# Patient Record
Sex: FEMALE | Employment: PART TIME | URBAN - METROPOLITAN AREA
[De-identification: names, ages, dates, MRNs, and addresses within clinical notes are randomized per-mention and may not be internally consistent; named-entity substitution may affect disease eponyms.]

---

## 2017-02-13 ENCOUNTER — TRANSFERRED RECORDS (OUTPATIENT)
Dept: HEALTH INFORMATION MANAGEMENT | Facility: CLINIC | Age: 37
End: 2017-02-13

## 2017-02-14 LAB
RUBELLA ANTIBODY IGG QUANTITATIVE: NORMAL IU/ML
TSH SERPL-ACNC: 1.88 MCU/ML

## 2017-02-18 LAB
ABO + RH BLD: NORMAL
ABO + RH BLD: NORMAL
BLD GP AB SCN SERPL QL: NEGATIVE
HBV SURFACE AG SERPL QL IA: NEGATIVE
HIV 1+2 AB+HIV1 P24 AG SERPL QL IA: NEGATIVE

## 2017-09-15 ENCOUNTER — TRANSFERRED RECORDS (OUTPATIENT)
Dept: HEALTH INFORMATION MANAGEMENT | Facility: CLINIC | Age: 37
End: 2017-09-15

## 2017-12-19 DIAGNOSIS — O36.80X0 PREGNANCY WITH INCONCLUSIVE FETAL VIABILITY: Primary | ICD-10-CM

## 2017-12-20 ENCOUNTER — TRANSFERRED RECORDS (OUTPATIENT)
Dept: HEALTH INFORMATION MANAGEMENT | Facility: CLINIC | Age: 37
End: 2017-12-20

## 2017-12-20 ENCOUNTER — RADIANT APPOINTMENT (OUTPATIENT)
Dept: ULTRASOUND IMAGING | Facility: CLINIC | Age: 37
End: 2017-12-20
Payer: COMMERCIAL

## 2017-12-20 ENCOUNTER — PRENATAL OFFICE VISIT (OUTPATIENT)
Dept: OBGYN | Facility: CLINIC | Age: 37
End: 2017-12-20
Payer: COMMERCIAL

## 2017-12-20 VITALS
HEART RATE: 64 BPM | BODY MASS INDEX: 19.76 KG/M2 | WEIGHT: 130.4 LBS | HEIGHT: 68 IN | DIASTOLIC BLOOD PRESSURE: 60 MMHG | SYSTOLIC BLOOD PRESSURE: 102 MMHG

## 2017-12-20 DIAGNOSIS — O09.511 SUPERVISION OF HIGH RISK ELDERLY PRIMIGRAVIDA IN FIRST TRIMESTER: Primary | ICD-10-CM

## 2017-12-20 DIAGNOSIS — O36.80X0 PREGNANCY WITH INCONCLUSIVE FETAL VIABILITY: ICD-10-CM

## 2017-12-20 LAB
ALBUMIN UR-MCNC: NEGATIVE MG/DL
APPEARANCE UR: CLEAR
BACTERIA #/AREA URNS HPF: ABNORMAL /HPF
BASOPHILS # BLD AUTO: 0 10E9/L (ref 0–0.2)
BASOPHILS NFR BLD AUTO: 0.2 %
BILIRUB UR QL STRIP: NEGATIVE
CAOX CRY #/AREA URNS HPF: ABNORMAL /HPF
COLOR UR AUTO: YELLOW
DIFFERENTIAL METHOD BLD: ABNORMAL
EOSINOPHIL # BLD AUTO: 0.1 10E9/L (ref 0–0.7)
EOSINOPHIL NFR BLD AUTO: 0.9 %
ERYTHROCYTE [DISTWIDTH] IN BLOOD BY AUTOMATED COUNT: 12.8 % (ref 10–15)
GLUCOSE UR STRIP-MCNC: NEGATIVE MG/DL
HCT VFR BLD AUTO: 37.6 % (ref 35–47)
HGB BLD-MCNC: 12.7 G/DL (ref 11.7–15.7)
HGB UR QL STRIP: NEGATIVE
KETONES UR STRIP-MCNC: NEGATIVE MG/DL
LEUKOCYTE ESTERASE UR QL STRIP: NEGATIVE
LYMPHOCYTES # BLD AUTO: 1.5 10E9/L (ref 0.8–5.3)
LYMPHOCYTES NFR BLD AUTO: 13.2 %
MCH RBC QN AUTO: 30 PG (ref 26.5–33)
MCHC RBC AUTO-ENTMCNC: 33.8 G/DL (ref 31.5–36.5)
MCV RBC AUTO: 89 FL (ref 78–100)
MONOCYTES # BLD AUTO: 0.7 10E9/L (ref 0–1.3)
MONOCYTES NFR BLD AUTO: 6.1 %
NEUTROPHILS # BLD AUTO: 9.3 10E9/L (ref 1.6–8.3)
NEUTROPHILS NFR BLD AUTO: 79.6 %
NITRATE UR QL: NEGATIVE
NON-SQ EPI CELLS #/AREA URNS LPF: ABNORMAL /LPF
PH UR STRIP: 6.5 PH (ref 5–7)
PLATELET # BLD AUTO: 240 10E9/L (ref 150–450)
RBC # BLD AUTO: 4.23 10E12/L (ref 3.8–5.2)
RBC #/AREA URNS AUTO: ABNORMAL /HPF
SOURCE: ABNORMAL
SP GR UR STRIP: 1.02 (ref 1–1.03)
UROBILINOGEN UR STRIP-ACNC: 0.2 EU/DL (ref 0.2–1)
WBC # BLD AUTO: 11.6 10E9/L (ref 4–11)
WBC #/AREA URNS AUTO: ABNORMAL /HPF

## 2017-12-20 PROCEDURE — 86780 TREPONEMA PALLIDUM: CPT | Performed by: NURSE PRACTITIONER

## 2017-12-20 PROCEDURE — 36415 COLL VENOUS BLD VENIPUNCTURE: CPT | Performed by: NURSE PRACTITIONER

## 2017-12-20 PROCEDURE — 85025 COMPLETE CBC W/AUTO DIFF WBC: CPT | Performed by: NURSE PRACTITIONER

## 2017-12-20 PROCEDURE — 81001 URINALYSIS AUTO W/SCOPE: CPT | Performed by: NURSE PRACTITIONER

## 2017-12-20 PROCEDURE — 99000 SPECIMEN HANDLING OFFICE-LAB: CPT | Performed by: NURSE PRACTITIONER

## 2017-12-20 PROCEDURE — 76817 TRANSVAGINAL US OBSTETRIC: CPT | Performed by: OBSTETRICS & GYNECOLOGY

## 2017-12-20 PROCEDURE — 40000791 ZZHCL STATISTIC VERIFI PRENATAL TRISOMY 21,18,13: Mod: 90 | Performed by: NURSE PRACTITIONER

## 2017-12-20 PROCEDURE — 87086 URINE CULTURE/COLONY COUNT: CPT | Performed by: NURSE PRACTITIONER

## 2017-12-20 PROCEDURE — 99207 ZZC FIRST OB VISIT: CPT | Performed by: NURSE PRACTITIONER

## 2017-12-20 RX ORDER — PROGESTERONE 50 MG/ML
INJECTION, SOLUTION INTRAMUSCULAR DAILY
COMMUNITY
End: 2018-01-09

## 2017-12-20 RX ORDER — PRENATAL VIT/IRON FUM/FOLIC AC 27MG-0.8MG
1 TABLET ORAL DAILY
COMMUNITY

## 2017-12-20 ASSESSMENT — ANXIETY QUESTIONNAIRES
1. FEELING NERVOUS, ANXIOUS, OR ON EDGE: NOT AT ALL
GAD7 TOTAL SCORE: 0
3. WORRYING TOO MUCH ABOUT DIFFERENT THINGS: NOT AT ALL
7. FEELING AFRAID AS IF SOMETHING AWFUL MIGHT HAPPEN: NOT AT ALL
5. BEING SO RESTLESS THAT IT IS HARD TO SIT STILL: NOT AT ALL
6. BECOMING EASILY ANNOYED OR IRRITABLE: NOT AT ALL
2. NOT BEING ABLE TO STOP OR CONTROL WORRYING: NOT AT ALL

## 2017-12-20 ASSESSMENT — PATIENT HEALTH QUESTIONNAIRE - PHQ9
SUM OF ALL RESPONSES TO PHQ QUESTIONS 1-9: 0
5. POOR APPETITE OR OVEREATING: NOT AT ALL

## 2017-12-20 NOTE — MR AVS SNAPSHOT
"              After Visit Summary   12/20/2017    Waqar Hogue    MRN: [id number]           Patient Information     Date Of Birth          1980        Visit Information        Provider Department      12/20/2017 1:30 PM Hermelinda Mckeon APRN CNP; WE TRIAGE [facility]        Today's Diagnoses     Supervision of high risk elderly primigravida in first trimester    -  1       Follow-ups after your visit        Your next 10 appointments already scheduled     Jan 09, 2018  3:40 PM CST   ESTABLISHED PRENATAL with Danielle Giang MD   [facility] ([facility])    [address] [id number] [phone]              Who to contact     If you have questions or need follow up information about today's clinic visit or your schedule please contact [facility] directly at [phone].  Normal or non-critical lab and imaging results will be communicated to you by MyChart, letter or phone within 4 business days after the clinic has received the results. If you do not hear from us within 7 days, please contact the clinic through C[employer] or phone. If you have a critical or abnormal lab result, we will notify you by phone as soon as possible.  Submit refill requests through [employer] or call your pharmacy and they will forward the refill request to us. Please allow 3 business days for your refill to be completed.          Additional Information About Your Visit        MyChart Information     [employer] lets you send messages to your doctor, view your test results, renew your prescriptions, schedule appointments and more. To sign up, go to [email or web address] . Click on \"Log in\" on the left side of the screen, which will take you to the Welcome page. Then click on \"Sign up Now\" on the right side of the page.     You will be asked to enter the access code listed below, as well as some personal " "information. Please follow the directions to create your username and password.     Your access code is: [email or web address]  Expires: 3/20/2018  3:42 PM     Your access code will  in 90 days. If you need help or a new code, please call your [place] clinic or [phone].        Care EveryWhere ID     This is your Care EveryWhere ID. This could be used by other organizations to access your [facility] medical records  [id number]        Your Vitals Were     Pulse Height BMI (Body Mass Index)             64 5' 8\" (1.727 m) 19.83 kg/m2          Blood Pressure from Last 3 Encounters:   17 102/60    Weight from Last 3 Encounters:   17 130 lb 6.4 oz (59.1 kg)              We Performed the Following     Anti Treponema     CBC with platelets differential     Non Invasive Prenatal Test Cell Free DNA     UA with Microscopic     Urine Culture Aerobic Bacterial        Primary Care Provider    None Specified       No primary provider on file.        Equal Access to Services     LUIS ANTONIO BARON : Hadii conchita Li, waalexda sushant, qadonata kaalmada padma, dalton moran . So [facility] [phone].    ATENCIÓN: Si habla español, tiene a contreras disposición servicios gratuitos de asistencia lingüística. Llame al [phone].    We comply with applicable federal civil rights laws and [place] laws. We do not discriminate on the basis of race, color, national origin, age, disability, sex, sexual orientation, or gender identity.            Thank you!     Thank you for choosing [facility] [place]  for your care. Our goal is always to provide you with excellent care. Hearing back from our patients is one way we can continue to improve our services. Please take a few minutes to complete the written survey that you may receive in the mail after your visit with us. Thank you!             Your Updated Medication List - Protect others around you: Learn how to safely use, store and throw " away your medicines at www.disposemymeds.org.          This list is accurate as of: 12/20/17  3:42 PM.  Always use your most recent med list.                   Brand Name Dispense Instructions for use Diagnosis    ESTRACE PO           FOLIC ACID PO      Take 1 mg by mouth daily        LOVENOX SC           prenatal multivitamin plus iron 27-0.8 MG Tabs per tablet      Take 1 tablet by mouth daily        progesterone (in sesame oil) 50 MG/ML injection      Inject into the muscle daily

## 2017-12-20 NOTE — PROGRESS NOTES
This is a 37 year old female patient,   who presents for her first obstetrical visit.    No LMP recorded. Patient is pregnant..  This gives her an EDC of 2018 .  She is 9w6d weeks.  Her cycles are regular.  Her last menstrual period was normal.  She has had an ultrasound on 2017 which showed 10w0d. .  Since her LMP, she has experienced  fatigue and breast tenderness, palptations, acne, anxiety unable to sleep).  She denies emesis, abdominal pain, headache, loss of appetite, vaginal discharge, dysuria, pelvic pain, urinary urgency, lightheadedness, urinary frequency, vaginal bleeding, hemorrhoids and constipation.        Past History:  Her past medical history History reviewed. No pertinent past medical history..      She has a history of  2 miscarriages    Since her last LMP she denies use of alcohol, tobacco and street drugs.    HISTORY:  Obstetric History       T0      L0     SAB2   TAB0   Ectopic0   Multiple0   Live Births0       # Outcome Date GA Lbr Dharmesh/2nd Weight Sex Delivery Anes PTL Lv   3 Current            2 2017           1 2007                History reviewed. No pertinent past medical history.  Past Surgical History:   Procedure Laterality Date     GYN SURGERY  2017    D&C for miscarriage     Family History   Problem Relation Age of Onset     Breast Cancer Mother      Social History     Social History     Marital status:      Spouse name: N/A     Number of children: N/A     Years of education: N/A     Social History Main Topics     Smoking status: Never Smoker     Smokeless tobacco: Never Used     Alcohol use No      Comment: not since pg     Drug use: No     Sexual activity: Yes     Partners: Male     Other Topics Concern     None     Social History Narrative     None     Current Outpatient Prescriptions   Medication Sig     Enoxaparin Sodium (LOVENOX SC)      progesterone, in sesame oil, 50 MG/ML injection Inject into the muscle daily      "Prenatal Vit-Fe Fumarate-FA (PRENATAL MULTIVITAMIN PLUS IRON) 27-0.8 MG TABS per tablet Take 1 tablet by mouth daily     Estradiol (ESTRACE PO)      FOLIC ACID PO Take 1 mg by mouth daily     No current facility-administered medications for this visit.      No Known Allergies    Past medical, surgical, social and family history were reviewed and updated in EPIC.    ROS:   12 point review of systems negative other than symptoms noted below.  Constitutional: Fatigue  Cardiovascular: Palpitations  Skin: Acne  Psychiatric: Anxiety and Difficulty Sleeping    EXAM:  /60  Pulse 64  Ht 5' 8\" (1.727 m)  Wt 130 lb 6.4 oz (59.1 kg)  BMI 19.83 kg/m2   BMI: Body mass index is 19.83 kg/(m^2).    EXAM:  Constitutional:  Appearance: Well nourished, well developed alert, in no acute distress.  Neck:   Lymph Nodes:  No lymphadenopathy present.    Thyroid:  Gland size normal, nontender, no nodules or masses present  on palpation.  Chest:  Respiratory Effort:  Breathing unlabored.  Cardiovascular:  Heart Auscultation:  Regular rate, normal rhythm, no murmurs    present.  Breasts: Deferred.    Axillary Lymph Nodes:  No lymphadenopathy present.  Gastrointestinal:  Abdominal Examination:  Abdomen nontender to palpation, tone  normal without rigidity or guarding, no masses present, umbilicus without  Lesions.    Liver and speen:  No hepatomegaly present, liver nontender to  palpation.    Hernias:  No hernias present.  Lymphatic: Lymph Nodes:  No other lymphadenopathy present.  Skin:  General Inspection:  No rashes present, no lesions present, no areas of  discoloration.    Genitalia and Groin:  No rashes present, no lesions present, no areas of  discoloration, no masses present.  Neurologic/Psychiatric:    Mental Status:  Oriented X3.    No Pelvic Exam performed  Patient declined.  ASSESSMENT:      ICD-10-CM    1. Supervision of high risk elderly primigravida in first trimester O09.511 UA with Microscopic     Urine Culture " Aerobic Bacterial     Anti Treponema     CBC with platelets differential     Non Invasive Prenatal Test Cell Free DNA       PLAN/PATIENT INSTRUCTIONS:    There are no Patient Instructions on file for this visit.    Normal 10 week pregnancy. Will do genetic testing. Return in 2-3 weeks for next OB visit with VIRGILIO Jain CNP

## 2017-12-20 NOTE — NURSING NOTE
[facility] Obstetrical Risk History    Patient presents for new OB labs and teaching.      1. Please indicate any condition you have or have had in the past:  Infertility  2. Do you smoke?  No  If yes, how many packs/day?   3. Do you drink alcoholic beverages? No  If yes, how often?  What type?   4. List any medications taken since your last period: progesterone, Lovenox, estrace, folic acid, prenatal  5. List any recreational drugs (cocaine, marijuana, etc. used since your last period:None    6. List any chemical or radiation exposure that you've encountered: None  7. Are you on a restricted diet? No  If yes, please describe:  Do you have any Mosque objections to any form of treatment? No    GENETIC SCREENING    These questions apply to you, the baby's father, or anyone in either family with:    1. Patient's age 35 or greater at delivery? Yes  2. Icelandic, Tajik, Mediterranean ancestry? No  3. Neural Tube Defect (Meningomyelocele, Spina Bifida, or Anencephaly)? No  4. Anabaptism, Czech South African or history of Brent-Sachs disease? No  5. Down's Syndrome?   No  6. Hemophilia or clotting disorder? No  7. Muscular Dystrophy? No  8. Cystic Fibrosis? Yes  9. Beltrami's Chorea? No  10. Mental Retardation? No  11. 3 or more miscarriages or a stillborn? No  12. Other inherited disease or chromosomal disorder? No  13. Have you or the baby's father had a child born with a birth defect? No  14. Did you or the baby's father have a birth defect yourselves? No    Do you have any other concerns about birth defects? No

## 2017-12-21 ENCOUNTER — TELEPHONE (OUTPATIENT)
Dept: OBGYN | Facility: CLINIC | Age: 37
End: 2017-12-21

## 2017-12-21 LAB
BACTERIA SPEC CULT: NO GROWTH
Lab: NORMAL
SPECIMEN SOURCE: NORMAL
T PALLIDUM IGG+IGM SER QL: NEGATIVE

## 2017-12-21 ASSESSMENT — ANXIETY QUESTIONNAIRES: GAD7 TOTAL SCORE: 0
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
## 2017-12-21 NOTE — NURSING NOTE
Pt medical records from [facility]  Vitamin D 36  2/13/17  Anti Mullerian Hormone 1.69  2/7/17  TSH 1.882 ulU/ml 2/14/17  Cardiolipin AB  IGG <14  9/15/17  Cardiolipin AB IGM <12  9/15/17B2   B2 Glycoprotein IGM <9 9/15/17  B2 Glycoprotein IGG <9 9/15/17  Lupus Anticoagulant is not detected  PTT LA screen 34 9/15/17  DRVVT Screen 35 9/15/17    FET   IVF cycle [id number], EDC 7/19/18,  ET date 10/31/17  Heparin type levonox 40 mg S  Good Start Genetics: No mutations were detected
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
## 2017-12-27 ENCOUNTER — TELEPHONE (OUTPATIENT)
Dept: NURSING | Facility: CLINIC | Age: 37
End: 2017-12-27

## 2017-12-27 NOTE — TELEPHONE ENCOUNTER
Pt calling in that she will be running out of her medications before Jan 4 that were prescribed by [facility] at the time of the IVF. ( Progesterone and Lovenox) She was to take the medications till Jan 4. Recommended that she contact [facility] to get add'l medication. IF any problems to call out clinic. Pt voices understanding.

## 2017-12-29 ENCOUNTER — TELEPHONE (OUTPATIENT)
Dept: NURSING | Facility: CLINIC | Age: 37
End: 2017-12-29

## 2017-12-29 NOTE — TELEPHONE ENCOUNTER
Innatal results: negative     Test    Result     Interpretation  Chromosome 21  No aneuploidy detected     Results consistent with two copies of chromosome 21  Chromosome 18  No aneuploidy detected  Results consistent with two copies of chromosome 18  Chromosome 13  No aneuploidy detected  Results consistent with two copies of chromosome 13  Sex Chromosome  No aneuploidy detected  Results consistent with two sex chromosomes (XX)-female    LMTCB.

## 2017-12-29 NOTE — TELEPHONE ENCOUNTER
Pt called back and informed of results. Pt stated understanding. Requested gender be placed in an envelope and brought to . Done as requested. Pt stated her  Francesco would  today.   Routing to Dr. Giang as an FYI.

## 2018-01-01 ENCOUNTER — TRANSFERRED RECORDS (OUTPATIENT)
Dept: HEALTH INFORMATION MANAGEMENT | Facility: CLINIC | Age: 38
End: 2018-01-01

## 2018-01-01 LAB — PAP SMEAR - HIM PATIENT REPORTED: NEGATIVE

## 2018-01-05 ENCOUNTER — TELEPHONE (OUTPATIENT)
Dept: NURSING | Facility: CLINIC | Age: 38
End: 2018-01-05

## 2018-01-05 NOTE — TELEPHONE ENCOUNTER
Pt was on progesterone, then endometrium.   Pt had some clear stretchy mucous d/c after a BM. Pt read online that it could be her mucous plug or water breaking.   Pt deneis color to d/c states it is clear, she denies odor or itching. Pt states she does not feel consistently wet. Informed pt ok to continue to monitor, if itching, bleeding, odor, color change to discharge to call back and inform. Also informed pt to call if feeling constantly leaking/wet. Pt stated understanding.   Pt is wondering if ok to have cortisone injections for her acne. She has appt at [facility] in 15 minutes and wanted to know if ok for her to get. She stated the MDs at the clinic stated it was safe in pregnancy but wondering what our clinic recommends. MDs all in rooms unable to speak to MD RN spoke to lead clinic RAJINDER Torres who stated probably ok if injecting small amounts into breakouts, likely more safe that oral steroid but that pt should weigh risk/benefirts. RAJINDER also stated for pt to ask MDs at [facility] skin clinic to make sure safe to get during pregnancy.  Informed pt she stated understanding, stated she would get injections today and discuss further with Dr. Giang at her visit next week. Pt had no further questions.

## 2018-01-09 ENCOUNTER — PRENATAL OFFICE VISIT (OUTPATIENT)
Dept: OBGYN | Facility: CLINIC | Age: 38
End: 2018-01-09
Payer: COMMERCIAL

## 2018-01-09 VITALS — BODY MASS INDEX: 20.07 KG/M2 | DIASTOLIC BLOOD PRESSURE: 72 MMHG | SYSTOLIC BLOOD PRESSURE: 118 MMHG | WEIGHT: 132 LBS

## 2018-01-09 DIAGNOSIS — Z3A.12 12 WEEKS GESTATION OF PREGNANCY: ICD-10-CM

## 2018-01-09 DIAGNOSIS — N96 HISTORY OF RECURRENT MISCARRIAGES: Primary | ICD-10-CM

## 2018-01-09 DIAGNOSIS — O09.519 SUPERVISION OF HIGH-RISK PREGNANCY OF ELDERLY PRIMIGRAVIDA: ICD-10-CM

## 2018-01-09 PROCEDURE — 99207 ZZC PRENATAL VISIT: CPT | Performed by: OBSTETRICS & GYNECOLOGY

## 2018-01-09 NOTE — MR AVS SNAPSHOT
After Visit Summary   1/9/2018    Waqar Hogue    MRN: [id number]           Patient Information     Date Of Birth          1980        Visit Information        Provider Department      1/9/2018 3:40 PM Danielle Giang MD [facility] [place]        Today's Diagnoses     History of recurrent miscarriages    -  1    Supervision of high-risk pregnancy of elderly primigravida        12 weeks gestation of pregnancy           Follow-ups after your visit        Additional Services     MAT FETAL MED CTR REFERRAL-PREGNANCY       >> Patient may proceed with recommendations for further testing as directed by the Maternal Fetal Medicine Specialist >>    >> If requesting Fetal Echo: MFM will determine appropriate location for exam due to indication.    >> If requesting Lung Maturity Amnio:  If results indicate fetal lung maturity, induction or C/S is recommended within 36 hours.  Please schedule accordingly.     Dear Patient:   Please be aware that coverage of these services is subject to the terms and limitations of your health insurance plan.  Call member services at your health plan with any benefit or coverage questions.      Please bring the following to your appointment:    >>  Any x-rays, CTs or MRIs which have been performed.  Contact the facility where they were done to arrange for  prior to your scheduled appointment.  Any new CT, MRI or other procedures ordered by your specialist must be performed at a [facility] facility or coordinated by your clinic's referral office.  >>  List of current medications   >>  This referral request   >>  Any documents/labs given to you for this referral                  Follow-up notes from your care team     Return in about 4 weeks (around 2/6/2018) for Routine Prenatal Visit.      Your next 10 appointments already scheduled     Feb 07, 2018  2:20 PM CST   ESTABLISHED PRENATAL with Danielle Giang MD   [facility]  "([facility])    [address] [id number] [phone]              Who to contact     If you have questions or need follow up information about today's clinic visit or your schedule please contact [facility] directly at [phone].  Normal or non-critical lab and imaging results will be communicated to you by MyChart, letter or phone within 4 business days after the clinic has received the results. If you do not hear from us within 7 days, please contact the clinic through MyChart or phone. If you have a critical or abnormal lab result, we will notify you by phone as soon as possible.  Submit refill requests through [employer] or call your pharmacy and they will forward the refill request to us. Please allow 3 business days for your refill to be completed.          Additional Information About Your Visit        MyChart Information     [employer] lets you send messages to your doctor, view your test results, renew your prescriptions, schedule appointments and more. To sign up, go to [email or web address] . Click on \"Log in\" on the left side of the screen, which will take you to the Welcome page. Then click on \"Sign up Now\" on the right side of the page.     You will be asked to enter the access code listed below, as well as some personal information. Please follow the directions to create your username and password.     Your access code is: [email or web address]  Expires: 3/20/2018  3:42 PM     Your access code will  in 90 days. If you need help or a new code, please call your [place] clinic or [phone].        Care EveryWhere ID     This is your Care EveryWhere ID. This could be used by other organizations to access your [place] medical records  [id number]        Your Vitals Were     Breastfeeding? BMI (Body Mass Index)                No 20.07 kg/m2           Blood Pressure from Last 3 Encounters:   18 118/72   17 102/60    " Weight from Last 3 Encounters:   01/09/18 132 lb (59.9 kg)   12/20/17 130 lb 6.4 oz (59.1 kg)              We Performed the Following     MAT FETAL MED CTR REFERRAL-PREGNANCY        Primary Care Provider Office Phone # Fax #    [facility] [place] [facility] [phone]       [place] [address] [address] [facility] [address]  [place] [address]        Equal Access to Services     LUIS ANTONIO BARON : Hadii aad ku hadasho Soomaali, waaxda luqadaha, qaybta kaalmada adeegyada, waxay idiin hayaan adeeg natikemalmahin lalb . So [facility] [phone].    ATENCIÓN: Si habla español, tiene a contreras disposición servicios gratuitos de asistencia lingüística. Kortneyame al [phone].    We comply with applicable federal civil rights laws and [place] laws. We do not discriminate on the basis of race, color, national origin, age, disability, sex, sexual orientation, or gender identity.            Thank you!     Thank you for choosing [facility] [place]  for your care. Our goal is always to provide you with excellent care. Hearing back from our patients is one way we can continue to improve our services. Please take a few minutes to complete the written survey that you may receive in the mail after your visit with us. Thank you!             Your Updated Medication List - Protect others around you: Learn how to safely use, store and throw away your medicines at www.disposemymeds.org.          This list is accurate as of: 1/9/18  5:24 PM.  Always use your most recent med list.                   Brand Name Dispense Instructions for use Diagnosis    FOLIC ACID PO      Take 1 mg by mouth daily        prenatal multivitamin plus iron 27-0.8 MG Tabs per tablet      Take 1 tablet by mouth daily

## 2018-01-09 NOTE — PROGRESS NOTES
Doing well today --extremely anxoius due to prior miscarriages  +nausea; no emesis; +fatigue  No vb/spotting/cramping  Denies bowel/bladder issues  AMA --normal innatal; will arrange Level II us with MFM; expect echo as well due to IVF  RTC 4wks

## 2018-01-12 ENCOUNTER — TELEPHONE (OUTPATIENT)
Dept: OBGYN | Facility: CLINIC | Age: 38
End: 2018-01-12

## 2018-01-12 NOTE — TELEPHONE ENCOUNTER
Pt called back  To return the call that she missed from triage . , she wants to know if there are any more test she can do after she finishes using the blood thinner. Ok to lm on her voicemail . She cleared messages so there is space available .

## 2018-01-12 NOTE — TELEPHONE ENCOUNTER
No additional testing needed at this time with regards to her history of miscarriage  With regards to the cortisone injections --as long as it is a small dose and given infrequently, should not be an issue; we don't use large doses or frequent dosing of steroids due to concern about growth restriction.  Should be fine with small intralesional injections

## 2018-01-12 NOTE — TELEPHONE ENCOUNTER
If dermatologist is fine, then okay to continue.  Obviously, I don't know dosages, etc.  If she has any reservations, she can stop them at any time.

## 2018-01-12 NOTE — TELEPHONE ENCOUNTER
Reviewed with Dr. Giang. Will have to relay on Derm regarding dose and frequency. Pt informed. Pt stated that Derm wants to talk about alternative therapy since pt is pregnant. Pt will discuss with derm at appointment today.

## 2018-01-12 NOTE — TELEPHONE ENCOUNTER
Pt informed. Pt has had two injection total. Given weekly. Pt has appointment scheduled for today at 3:30 and wanted to get another injection. Two intralesional sites were treated with the last two injections and pt wanted three lesions treated today. Routing to Dr. Giang.

## 2018-01-12 NOTE — TELEPHONE ENCOUNTER
Reason for Call:  Other call back    Detailed comments: patient wants to ask Dr. Giang if it is safe to have some meds put on her face for acne while pregnant?    Phone Number Patient can be reached at: Cell number on file:    Telephone Information:   Mobile [phone]       Best Time: today    Can we leave a detailed message on this number? YES    Call taken on 1/12/2018 at 8:48 AM by Misti Lopez

## 2018-01-12 NOTE — TELEPHONE ENCOUNTER
Pt was wondering about the cortisone injections for her acne-pt wondering if Dr. Giang thinks it is ok. The provider that does it says it is safe as it is a small amount and is injected only into her problem areas. Pt just wants to check with Dr. Giang as she is her OB.     Pt has stopped Lovenox a week ago yesterday, and off her progesterone injections. Pt is wanting to check that she doesn't need additional lab tests of ultrasounds to make sure everything is ok without them. Pt is worried due to history of miscarriage.     Routing to Dr. Giang to review and advise.

## 2018-01-18 ENCOUNTER — TELEPHONE (OUTPATIENT)
Dept: NURSING | Facility: CLINIC | Age: 38
End: 2018-01-18

## 2018-01-18 NOTE — TELEPHONE ENCOUNTER
Pt has stomach flu. Started having N&V last night. Denies fever or respiratory symptoms. Trying to drink fluids. Each time she eats has diarrhea. Denies change in color of urine.  ill Monday and Tuesday. Pt informed to rest, try and push fluids. Can drink Gatorade or Pedialyte.  Try bland diet, brat diet. Call if not better tomorrow.

## 2018-01-29 ENCOUNTER — TELEPHONE (OUTPATIENT)
Dept: NURSING | Facility: CLINIC | Age: 38
End: 2018-01-29

## 2018-01-29 DIAGNOSIS — L70.0 ACNE VULGARIS: Primary | ICD-10-CM

## 2018-01-29 NOTE — TELEPHONE ENCOUNTER
15w5d, MYRTLE 7/18/18. Pt is having bad breakouts of her acne. Pt is wondering if she could get an Rx for a topical clindamycin cream for her face. Routing to Dr. Giang. OK to send?

## 2018-01-29 NOTE — TELEPHONE ENCOUNTER
Call the pt back. She does not have a current dermatologist. In the past she had used [facility] which had 1.2% of clindamycin and tretinoin med  in it.  She was hoping the doctor would just write rx for the clindamycin- She is willing to go to a dermatologist if needed. Would like a referral/ suggestion on who to see.  Route to Dr Giang to advise.

## 2018-01-30 RX ORDER — CLINDAMYCIN PHOSPHATE 11.9 MG/ML
SOLUTION TOPICAL 2 TIMES DAILY
Qty: 60 ML | Refills: 3 | Status: SHIPPED | OUTPATIENT
Start: 2018-01-30

## 2018-01-30 NOTE — TELEPHONE ENCOUNTER
Rx sent for cleocin T solution --can apply BID.  If doesn't work well, should see dermatology --can give either [facility] in [place] or [facility] in [facility]

## 2018-02-07 ENCOUNTER — PRENATAL OFFICE VISIT (OUTPATIENT)
Dept: OBGYN | Facility: CLINIC | Age: 38
End: 2018-02-07
Payer: COMMERCIAL

## 2018-02-07 VITALS — SYSTOLIC BLOOD PRESSURE: 116 MMHG | WEIGHT: 138 LBS | DIASTOLIC BLOOD PRESSURE: 72 MMHG | BODY MASS INDEX: 20.98 KG/M2

## 2018-02-07 DIAGNOSIS — O09.812 PREGNANCY RESULTING FROM IN VITRO FERTILIZATION IN SECOND TRIMESTER: ICD-10-CM

## 2018-02-07 DIAGNOSIS — Z3A.17 17 WEEKS GESTATION OF PREGNANCY: ICD-10-CM

## 2018-02-07 DIAGNOSIS — O09.519 SUPERVISION OF HIGH-RISK PREGNANCY OF ELDERLY PRIMIGRAVIDA: Primary | ICD-10-CM

## 2018-02-07 PROCEDURE — 99000 SPECIMEN HANDLING OFFICE-LAB: CPT | Performed by: OBSTETRICS & GYNECOLOGY

## 2018-02-07 PROCEDURE — 99207 ZZC PRENATAL VISIT: CPT | Performed by: OBSTETRICS & GYNECOLOGY

## 2018-02-07 PROCEDURE — 36415 COLL VENOUS BLD VENIPUNCTURE: CPT | Performed by: OBSTETRICS & GYNECOLOGY

## 2018-02-07 PROCEDURE — 82105 ALPHA-FETOPROTEIN SERUM: CPT | Mod: 90 | Performed by: OBSTETRICS & GYNECOLOGY

## 2018-02-07 NOTE — MR AVS SNAPSHOT
After Visit Summary   2/7/2018    Waqar Hogue    MRN: [id number]           Patient Information     Date Of Birth          1980        Visit Information        Provider Department      2/7/2018 2:20 PM Danielle Giang MD [facility] [place]        Today's Diagnoses     Supervision of high-risk pregnancy of elderly primigravida    -  1    Pregnancy resulting from in vitro fertilization in second trimester        17 weeks gestation of pregnancy           Follow-ups after your visit        Follow-up notes from your care team     Return in about 4 weeks (around 3/7/2018) for Routine Prenatal Visit.      Your next 10 appointments already scheduled     Feb 19, 2018 10:15 AM CST   Genetic Counseling with  GEN COUNSELOR 1   [facility] Maternal Fetal Medicine - [facility])    [address] [place] [id number] [phone]            Feb 19, 2018 11:00 AM CST   (Arrive by 10:45 AM)   CHERYLE US COMP with MFMUSR3   [facility] Maternal Fetal Medicine Ultrasound - [facility] ([facility])    [address] [place] [id number] [phone]           Wear comfortable clothes and leave your valuables at home.            Feb 19, 2018 11:30 AM CST   Radiology MD with  CHERYLE NAYAK   [facility] Maternal Fetal Medicine [facility])    [address] [place] [id number] [phone]           Please arrive at the time given for your first appointment. This visit is used internally to schedule the physician's time during your ultrasound.              Who to contact     If you have questions or need follow up information about today's clinic visit or your schedule please contact [facility] [place] directly at [phone].  Normal or non-critical lab and imaging results will be communicated to you by MyChart, letter or phone within 4 business days after the  "clinic has received the results. If you do not hear from us within 7 days, please contact the clinic through [employer] or phone. If you have a critical or abnormal lab result, we will notify you by phone as soon as possible.  Submit refill requests through [employer] or call your pharmacy and they will forward the refill request to us. Please allow 3 business days for your refill to be completed.          Additional Information About Your Visit        Traveler | VIPharSyndevrx Information     [employer] lets you send messages to your doctor, view your test results, renew your prescriptions, schedule appointments and more. To sign up, go to [email or web address] . Click on \"Log in\" on the left side of the screen, which will take you to the Welcome page. Then click on \"Sign up Now\" on the right side of the page.     You will be asked to enter the access code listed below, as well as some personal information. Please follow the directions to create your username and password.     Your access code is: [email or web address]  Expires: 3/20/2018  3:42 PM     Your access code will  in 90 days. If you need help or a new code, please call your [facility] clinic or [phone].        Care EveryWhere ID     This is your Care EveryWhere ID. This could be used by other organizations to access your [facility] medical records  [id number]        Your Vitals Were     BMI (Body Mass Index)                   20.98 kg/m2            Blood Pressure from Last 3 Encounters:   18 116/72   18 118/72   17 102/60    Weight from Last 3 Encounters:   18 138 lb (62.6 kg)   18 132 lb (59.9 kg)   17 130 lb 6.4 oz (59.1 kg)              We Performed the Following     Alpha fetoprotein maternal screen        Primary Care Provider Office Phone # Fax #    [facility] [phone]       [facility] [address] [facility] [address]  [facility] [address]        Equal Access to Services     " LUIS ANTONIO BARON : Hadii aad ku adilia Li, waaxda luqadaha, qaybta kaalmada destineysergiojosr, dalton damiankemalmahin moran . So [facility] [phone].    ATENCIÓN: Si habla español, tiene a contreras disposición servicios gratuitos de asistencia lingüística. Llame al [phone].    We comply with applicable federal civil rights laws and [place] laws. We do not discriminate on the basis of race, color, national origin, age, disability, sex, sexual orientation, or gender identity.            Thank you!     Thank you for choosing [facility]  for your care. Our goal is always to provide you with excellent care. Hearing back from our patients is one way we can continue to improve our services. Please take a few minutes to complete the written survey that you may receive in the mail after your visit with us. Thank you!             Your Updated Medication List - Protect others around you: Learn how to safely use, store and throw away your medicines at www.disposemymeds.org.          This list is accurate as of 2/7/18  2:50 PM.  Always use your most recent med list.                   Brand Name Dispense Instructions for use Diagnosis    clindamycin 1 % solution    CLEOCIN-T    60 mL    Apply topically 2 times daily    Acne vulgaris       FOLIC ACID PO      Take 1 mg by mouth daily        prenatal multivitamin plus iron 27-0.8 MG Tabs per tablet      Take 1 tablet by mouth daily

## 2018-02-07 NOTE — PROGRESS NOTES
Doing well today.    Occ cramping; no vb/spotting/lof  Worried about exposures at school --recommended hygiene, avoidance, etc  AFP only today  Has level II us with MFM on 2/19 for IVF pregnancy  RTC 4wks  Declines flu shot

## 2018-02-09 LAB
# FETUSES US: NORMAL
AFP ADJ MOM AMN: 0.98
AFP SERPL-MCNC: 39 NG/ML
AGE - REPORTED: 36.8 YR
DATING METHOD: NORMAL
DIABETIC AT CONCEPTION: NO
FAMILY MEMBER DISEASES HX: NO
FAMILY MEMBER DISEASES HX: NO
GA METHOD: NORMAL
GA: 17 WEEKS
HX OF HEREDITARY DISORDERS: NO
IDDM PATIENT QL: NO
INTEGRATED SCN PATIENT-IMP: NORMAL
LMP START DATE: NORMAL
PREV HX CHROMOSOME ABNORMALITY: NO
SPECIMEN DRAWN SERPL: NORMAL
TWINS: NORMAL

## 2018-02-09 NOTE — PROGRESS NOTES
Please inform of normal results --normal AFP; no increased risk of spinal cord or neural tube defects

## 2018-02-12 ENCOUNTER — PRE VISIT (OUTPATIENT)
Dept: MATERNAL FETAL MEDICINE | Facility: CLINIC | Age: 38
End: 2018-02-12

## 2018-02-13 ENCOUNTER — TELEPHONE (OUTPATIENT)
Dept: NURSING | Facility: CLINIC | Age: 38
End: 2018-02-13

## 2018-02-13 NOTE — TELEPHONE ENCOUNTER
17w6d; MYRTLE: 7/18/2018  Pt calling stating she is reading a book right now indicating how important it is to be sleeping on her side and the pt states that she woke up last night on her back (she is typically a back sleeper) and her left arm was numb and she was not laying on it. Pt is trying to sleep on her side but is having a hard time with it. Reviewed laying on back baby presses on the abdominal aorta and restricts blood supply to extremities. Pt indicates her arm is fine now but pt is concerned about during that time baby did not get enough blood supply. Pt wanting to know how important it is that she sleep on her side. Denies vaginal bleeding, denies LOF, denies cramping or contractions, unsure if has felt fetal movement or not (pt aware fetal movement occurs between 16-22 wks & regular consistent movement not until 28 wks). Routing to Dr. Giang to review and advise.      Triage-when we call pt back if she does not answer her phone a message with personal information can be left on there, per pt.

## 2018-02-13 NOTE — TELEPHONE ENCOUNTER
This early on I don't worry about sleeping position --this really only comes into play between 20-24wks once the uterus is at the level of the umbilicus.  No concerns about blood supply to baby whatsoever from me.

## 2018-02-19 ENCOUNTER — HOSPITAL ENCOUNTER (OUTPATIENT)
Dept: ULTRASOUND IMAGING | Facility: CLINIC | Age: 38
Discharge: HOME | End: 2018-02-19
Attending: OBSTETRICS & GYNECOLOGY | Admitting: OBSTETRICS & GYNECOLOGY
Payer: COMMERCIAL

## 2018-02-19 ENCOUNTER — OFFICE VISIT (OUTPATIENT)
Dept: MATERNAL FETAL MEDICINE | Facility: CLINIC | Age: 38
End: 2018-02-19
Attending: OBSTETRICS & GYNECOLOGY
Payer: COMMERCIAL

## 2018-02-19 DIAGNOSIS — O43.199 MARGINAL INSERTION OF UMBILICAL CORD AFFECTING MANAGEMENT OF MOTHER: ICD-10-CM

## 2018-02-19 DIAGNOSIS — O35.EXX0 PYELECTASIS OF FETUS ON PRENATAL ULTRASOUND: ICD-10-CM

## 2018-02-19 DIAGNOSIS — O09.812 PREGNANCY RESULTING FROM ASSISTED REPRODUCTIVE TECHNOLOGY IN SECOND TRIMESTER: Primary | ICD-10-CM

## 2018-02-19 DIAGNOSIS — O26.90 PREGNANCY RELATED CONDITION, ANTEPARTUM: ICD-10-CM

## 2018-02-19 DIAGNOSIS — O09.519 SUPERVISION OF HIGH-RISK PREGNANCY OF ELDERLY PRIMIGRAVIDA: ICD-10-CM

## 2018-02-19 DIAGNOSIS — Z03.73 SUSPECTED FETAL ANOMALY NOT FOUND: ICD-10-CM

## 2018-02-19 DIAGNOSIS — O09.512 ADVANCED MATERNAL AGE, 1ST PREGNANCY, SECOND TRIMESTER: ICD-10-CM

## 2018-02-19 PROCEDURE — 96040 ZZH GENETIC COUNSELING, EACH 30 MINUTES: CPT | Mod: ZF | Performed by: GENETIC COUNSELOR, MS

## 2018-02-19 PROCEDURE — 76811 OB US DETAILED SNGL FETUS: CPT

## 2018-02-19 NOTE — MR AVS SNAPSHOT
After Visit Summary   2/19/2018    Waqar Hogue    MRN: [id number]           Patient Information     Date Of Birth          1980        Visit Information        Provider Department      2/19/2018 11:30 AM Kelsey Bautista MD [facility]etal [facility]        Today's Diagnoses     Pregnancy resulting from assisted reproductive technology in second trimester    -  1    Advanced maternal age, 1st pregnancy, second trimester        Marginal insertion of umbilical cord affecting management of mother        Suspected fetal anomaly not found        Pyelectasis of fetus on prenatal ultrasound           Follow-ups after your visit        Your next 10 appointments already scheduled     Mar 06, 2018  2:10 PM CST   ESTABLISHED PRENATAL with Danielle Giang MD   [facility] [place] ([facility])    [address] [place] [address] [address]  [facility] [address]   [phone]            Mar 15, 2018  8:45 AM CDT   (Arrive by 8:30 AM)   CHERYLE US COMPRE SINGLE F/U with [email or web address]   [facility] Maternal Fetal Medicine Ultrasound - [facility] ([facility])    [address] [place] [id number] [phone]           Wear comfortable clothes and leave your valuables at home.            Mar 15, 2018  9:15 AM CDT   Radiology MD with  CHERYLE NAYAK   [facility] Maternal Fetal Medicine [facility] ([facility])    [address] [place] [address] [phone]           Please arrive at the time given for your first appointment. This visit is used internally to schedule the physician's time during your ultrasound.            Mar 15, 2018  9:30 AM CDT   (Arrive by 9:15 AM)   CHERYLE READ SCREEN FETAL EHCO Patel with [email or web address]   [facility] Maternal Fetal Medicine Ultrasound - [facility] ([facility])    [address] [place] [address] [phone]             "  Future tests that were ordered for you today     Open Future Orders        Priority Expected Expires Ordered    MFM Read Screen Fetal Echo Single Routine 3/12/2018 2018 2018    MFM US Comprehensive Single F/U Routine 3/12/2018 2019 2018            Who to contact     If you have questions or need follow up information about today's clinic visit or your schedule please contact [facility] directly at [phone].  Normal or non-critical lab and imaging results will be communicated to you by Afoundriahart, letter or phone within 4 business days after the clinic has received the results. If you do not hear from us within 7 days, please contact the clinic through Afoundriahart or phone. If you have a critical or abnormal lab result, we will notify you by phone as soon as possible.  Submit refill requests through [employer] or call your pharmacy and they will forward the refill request to us. Please allow 3 business days for your refill to be completed.          Additional Information About Your Visit        AfoundriaharAbsio Information     [employer] lets you send messages to your doctor, view your test results, renew your prescriptions, schedule appointments and more. To sign up, go to [email or web address] . Click on \"Log in\" on the left side of the screen, which will take you to the Welcome page. Then click on \"Sign up Now\" on the right side of the page.     You will be asked to enter the access code listed below, as well as some personal information. Please follow the directions to create your username and password.     Your access code is: [email or web address]  Expires: 3/20/2018  3:42 PM     Your access code will  in 90 days. If you need help or a new code, please call your [facility] clinic or [phone].        Care EveryWhere ID     This is your Care EveryWhere ID. This could be used by other organizations to access your [facility] medical records  [id number]         Blood Pressure " from Last 3 Encounters:   02/07/18 116/72   01/09/18 118/72   12/20/17 102/60    Weight from Last 3 Encounters:   02/07/18 62.6 kg (138 lb)   01/09/18 59.9 kg (132 lb)   12/20/17 59.1 kg (130 lb 6.4 oz)               Primary Care Provider Office Phone # Fax #    [facility] [phone]       [facility] [address] [facility] [address]  [facility] [address]        Equal Access to Services     LUIS ANTONIO BARON : Hadii aad ku hadasho Soomaali, waaxda luqadaha, qaybta kaalmada adetyyajosr, dalton moran . So [facility] [phone].    ATENCIÓN: Si habla español, tiene a contreras disposición servicios gratuitos de asistencia lingüística. [facility] [phone].    We comply with applicable federal civil rights laws and [place] laws. We do not discriminate on the basis of race, color, national origin, age, disability, sex, sexual orientation, or gender identity.            Thank you!     Thank you for choosing [facility]  for your care. Our goal is always to provide you with excellent care. Hearing back from our patients is one way we can continue to improve our services. Please take a few minutes to complete the written survey that you may receive in the mail after your visit with us. Thank you!             Your Updated Medication List - Protect others around you: Learn how to safely use, store and throw away your medicines at www.disposemymeds.org.          This list is accurate as of 2/19/18  3:02 PM.  Always use your most recent med list.                   Brand Name Dispense Instructions for use Diagnosis    clindamycin 1 % solution    CLEOCIN-T    60 mL    Apply topically 2 times daily    Acne vulgaris       FOLIC ACID PO      Take 1 mg by mouth daily        prenatal multivitamin plus iron 27-0.8 MG Tabs per tablet      Take 1 tablet by mouth daily

## 2018-02-19 NOTE — PROGRESS NOTES
Please see ultrasound report under imaging tab for details on ultrasound performed today.    Kelsey Bautista MD  , OB/GYN  Maternal-Fetal Medicine  [email or web address]  [phone] (Academic office)  [phone] (Pager)

## 2018-02-19 NOTE — PROGRESS NOTES
[facility]  Genetic Counseling Consult    Patient:  Waqar Hogue YOB: 1980   Date of Service:  18      Waqar Hogue was seen at the [facility] [place] for genetic consultation as part of her appointment for comprehensive ultrasound.  The indication for genetic counseling is advanced maternal age.  Waqar was accompanied to her appointment today by her  Jaquan.         Impression/Plan:   1. Waqar had a cell-free fetal DNA test earlier in pregnancy, which was normal.    2. Waqar had a comprehensive (level II) ultrasound today, which was incomplete and noted mild renal pylectasis.  Please see the ultrasound report for further details.    3. The patient declines genetic amniocentesis and additional maternal serum screening today.     4. Fetal echocardiogram to assess fetal heart structures is recommended for all pregnancies conceived via IVF, and Waqar will return to [facility] for this ultrasound in approximately 2 weeks.      Pregnancy History:   /Parity:    Age at Delivery: 37 year old  MYRTLE: 2018, by Ultrasound  Gestational Age: 18w5d    No significant complications or exposures were reported in the current pregnancy.    Waqar matute pregnancy history is significant for 2 previous SAB with no known cause.    Medical History:   Waqar matute reported medical history is not expected to impact pregnancy management or risks to fetal development.       Family History:   A three-generation pedigree was obtained, and is scanned under the  Media  tab.   The following significant findings were reported by Waqar:    Mother, 54, recently diagnosed with breast cancer and per report, had genetic testing for genes associated with increased risk for breast cancer (BRCA1/2 and possibly others) the results of which were negative.  We discussed that there may still be increased risk for breast cancer for Waqar and  that a discussion of screening recommendations with her primary care provider may be beneficial.     Maternal uncle is a known carrier for cystic fibrosis, and has two affected sons.  Vinceania has had negative CF carrier screening, the results of which were reviewed today and greatly reduce the risk for an affected pregnancy.       No other significant findings in any first, second, or 3rd degree relatives to the pregnancy, please see pedigree for full details.      Otherwise, the reported family history is negative for multiple miscarriages, stillbirths, birth defects, mental retardation, known genetic conditions, and consanguinity.       Carrier Screening:   The patient reports that she is of  ancestry:      Cystic fibrosis is an autosomal recessive genetic condition that occurs with increased frequency in individuals of  ancestry and carrier screening for this condition is available.  In addition,  screening in the [facility] includes cystic fibrosis.    The patient reports that the father of the pregnancy has  ancestry:      The hemoglobinopathies are a group of genetic blood diseases that occur with increased frequency in individuals of  ancestry and carrier screening for these conditions is available.  Carrier screening for the hemoglobinopathies includes a CBC with red blood cell indices, a ferritin level, and a quantitative hemoglobin electrophoresis or HPLC.  In addition,  screening in the [facility] includes many of the hemoglobinopathies.      Expanded carrier screening for mutations in a large panel of genes associated with autosomal recessive conditions including cystic fibrosis, spinal muscular atrophy, and others, is now available.      The patient has had previous carrier screening for 22 recessive genetic conditions, including cystic fibrosis and thalassemias, the results of which were negative.  A copy of the report was available for  our review today.       Risk Assessment for Chromosome Conditions:   We explained that the risk for fetal chromosome abnormalities increases with maternal age. We discussed specific features of common chromosome abnormalities, including Down syndrome, trisomy 13, trisomy 18, and sex chromosome trisomies.      - At age 37 at midtrimester, the risk to have a baby with Down syndrome is 1 in 216.     - At age 37 at midtrimester, the risk to have a baby with any chromosome abnormality is 1 in 105.     - At age 37 at delivery, the risk to have a baby with Down syndrome is 1 in 294.     - At age 37 at delivery, the risk to have a baby with any chromosome abnormality is 1 in 163.       [facility] had maternal serum screening earlier in pregnancy.       Non-invasive Prenatal Testing (NIPT)    Maternal plasma cell-free DNA testing    Screens for fetal trisomy 21, trisomy 13, trisomy 18, and sex chromosome aneuploidy    First trimester ultrasound with nuchal translucency and nasal bone assessment was not performed in this pregnancy, to our knowledge.    [facility] had a Innatal NIPT test earlier in pregnancy; we reviewed the results today, which are normal for chromosome 13, chromosome 18 and chromosome 21 (no aneuploidy detected)    Given the accuracy of this test, these results greatly decrease the chance for certain fetal chromosome abnormalities    We discussed the limitations of normal NIPT results    MSAFP (after 15 weeks for open neural tube defect screening) results were within normal limits, which decreased the risk of an open neural tube defect in the pregnancy to 1 in 10,000.      Today's ultrasound noted bilateral renal ptyalectasis.  This ultrasound finding is a marker that is associated with an approximately 1.5 fold increase in risk for Down syndrome (RR 1.4-1.6).  I met with the couple after their ultrasound to discuss adjusted risk estimates using this ultrasound finding and [facility]'s already completed NIPT.  We  discussed that a negative NIPT result lowers the risk for her pregnancy to be affected with Down syndrome to below 1 in 10,000, and that a 1.5 fold increase of their risk is still below 1 in 10,000.  Waqar's  Jaquan has a scheduled doctor's appointment to evaluate his kidney function, and the couple asked if this could be related.  At this time, there is not a clear connection between the ultrasound finding and her 's possible proteinuria, but I encouraged the couple to reach out to me as they learn more after his evaluation for continued discussion.         Testing Options:   We discussed the following options:   Non-invasive Prenatal Testing (NIPT)    Maternal plasma cell-free DNA testing; first trimester ultrasound with nuchal translucency and nasal bone assessment is recommended, when appropriate    Screens for fetal trisomy 21, trisomy 13, trisomy 18, and sex chromosome aneuploidy    Cannot screen for open neural tube defects; maternal serum AFP after 15 weeks is recommended       Genetic Amniocentesis    Invasive procedure typically performed in the second trimester by which amniotic fluid is obtained for the purpose of chromosome analysis and/or other prenatal genetic analysis    Diagnostic results; >99% sensitivity for fetal chromosome abnormalities    AFAFP measurement tests for open neural tube defects       Comprehensive (Level II) ultrasound:    Detailed ultrasound performed between 18-22 weeks gestation    Screen for major birth defects and markers for aneuploidy    We reviewed the benefits and limitations of this testing.  Screening tests provide a risk assessment specific to the pregnancy for certain fetal chromosome abnormalities, but cannot definitively diagnose or exclude a fetal chromosome abnormality.  Follow-up genetic counseling and consideration of diagnostic testing is recommended with any abnormal screening result. Diagnostic tests carry inherent risks- including risk of  miscarriage- that require careful consideration.  These tests can detect fetal chromosome abnormalities with greater than 99% certainty.  Results can be compromised by maternal cell contamination or mosaicism, and are limited by the resolution of cytogenetic G-banding technology.  There is no screening nor diagnostic test that can detect all forms of birth defects or mental disability.    It was a pleasure [facility] be [facility]. Face-to-face time of the meeting was 40 minutes.    Vadim Moreno MS, [facility]  Licensed Genetic Counselor  Phone: [phone]  Pager: [phone]

## 2018-02-19 NOTE — MR AVS SNAPSHOT
After Visit Summary   2/19/2018    Waqar Hogue    MRN: [id number]           Patient Information     Date Of Birth          1980        Visit Information        Provider Department      2/19/2018 10:15 AM Vadim Moreno GC [facility] Maternal Fetal Medicine [facility]        Today's Diagnoses     Pregnancy related condition, antepartum        Supervision of high-risk pregnancy of elderly primigravida           Follow-ups after your visit        Your next 10 appointments already scheduled     Mar 06, 2018  2:10 PM CST   ESTABLISHED PRENATAL with Danielle Giang MD   [facility] [place] ([facility])    [address] [place] [address] [address]  [place] [address]   [phone]            Mar 15, 2018  8:45 AM CDT   (Arrive by 8:30 AM)   MFM US COMPRE SINGLE F/U with [id number]   [facility] Maternal Fetal Medicine Ultrasound [facility] ([facility])    [address] [place] [address] [address]  [facility] [address]   [phone]           Wear comfortable clothes and leave your valuables at home.            Mar 15, 2018  9:15 AM CDT   Radiology MD with  CHERYLE NAYAK   [facility] Maternal Fetal Medicine [facility] ([facility])    [address] [place] [address]  [facility] [address]   [phone]           Please arrive at the time given for your first appointment. This visit is used internally to schedule the physician's time during your ultrasound.            Mar 15, 2018  9:30 AM CDT   (Arrive by 9:15 AM)   MFM READ SCREEN FETAL EHCO Patel with [email or web address]   [facility] Maternal Fetal Medicine Ultrasound - [facility] ([facility])    [address] [place] [address]   [phone]              Future tests that were ordered for you today     Open Future Orders        Priority Expected Expires Ordered    MFM Read Screen Fetal Echo Single Routine 3/12/2018 12/19/2018 2/19/2018    MFM US Comprehensive  "Single F/U Routine 3/12/2018 2019 2018            Who to contact     If you have questions or need follow up information about today's clinic visit or your schedule please contact [facility] MATERNAL FETAL MEDICINE [facility] directly at [phone].  Normal or non-critical lab and imaging results will be communicated to you by MyChart, letter or phone within 4 business days after the clinic has received the results. If you do not hear from us within 7 days, please contact the clinic through AFrame Digitalhart or phone. If you have a critical or abnormal lab result, we will notify you by phone as soon as possible.  Submit refill requests through Agrican or call your pharmacy and they will forward the refill request to us. Please allow 3 business days for your refill to be completed.          Additional Information About Your Visit        MyChart Information     Agrican lets you send messages to your doctor, view your test results, renew your prescriptions, schedule appointments and more. To sign up, go to [email or web address] . Click on \"Log in\" on the left side of the screen, which will take you to the Welcome page. Then click on \"Sign up Now\" on the right side of the page.     You will be asked to enter the access code listed below, as well as some personal information. Please follow the directions to create your username and password.     Your access code is: [email or web address]  Expires: 3/20/2018  3:42 PM     Your access code will  in 90 days. If you need help or a new code, please call your [place] clinic or [phone].        Care EveryWhere ID     This is your Care EveryWhere ID. This could be used by other organizations to access your [place] medical records  [id number]         Blood Pressure from Last 3 Encounters:   18 116/72   18 118/72   17 102/60    Weight from Last 3 Encounters:   18 62.6 kg (138 lb)   18 59.9 kg (132 lb)   17 59.1 kg (130 lb 6.4 oz)         "      We Performed the Following     [facility] Genetic Counseling        Primary Care Provider Office Phone # Fax #    [facility] [phone]       [facility] [address]  [facility] [address]        Equal Access to Services     LUIS ANTONIO BARON : Hadii aad ku hadasho Soomaali, waaxda luqadaha, qaybta kaalmada adeegyada, waxay angelicain haycalosn tawana damiankemalmahin bridges. So [place] [phone].    ATENCIÓN: Si habla español, tiene a contreras disposición servicios gratuitos de asistencia lingüística. Kortneyantonella al [phone].    We comply with applicable federal civil rights laws and [place] laws. We do not discriminate on the basis of race, color, national origin, age, disability, sex, sexual orientation, or gender identity.            Thank you!     Thank you for choosing [facility]  for your care. Our goal is always to provide you with excellent care. Hearing back from our patients is one way we can continue to improve our services. Please take a few minutes to complete the written survey that you may receive in the mail after your visit with us. Thank you!             Your Updated Medication List - Protect others around you: Learn how to safely use, store and throw away your medicines at www.disposemymeds.org.          This list is accurate as of 2/19/18  3:46 PM.  Always use your most recent med list.                   Brand Name Dispense Instructions for use Diagnosis    clindamycin 1 % solution    CLEOCIN-T    60 mL    Apply topically 2 times daily    Acne vulgaris       FOLIC ACID PO      Take 1 mg by mouth daily        prenatal multivitamin plus iron 27-0.8 MG Tabs per tablet      Take 1 tablet by mouth daily

## 2018-02-20 ENCOUNTER — TELEPHONE (OUTPATIENT)
Dept: MATERNAL FETAL MEDICINE | Facility: CLINIC | Age: 38
End: 2018-02-20

## 2018-02-20 DIAGNOSIS — O09.519 SUPERVISION OF HIGH-RISK PREGNANCY OF ELDERLY PRIMIGRAVIDA: ICD-10-CM

## 2018-02-20 NOTE — TELEPHONE ENCOUNTER
2/20/2018    Waqar called with several questions regarding the results of her comprehensive ultrasound which took place 2/19.  Yesterday's ultrasound noted mild pyelectasis and was incomplete for views of the facial profile, heart, and sacral spine.  Waqar is returning in approximately 3 weeks to complete her comprehensive ultrasound.  Yesterday we discussed pyelectasis as a marker for increased risk for Down syndrome, and Waqar called today to inquire about other markers that were assessed on yesterday's scan.  We discussed that besides the kidneys, there were no markers of increased risk for Down syndrome identified.  We discussed that on her ultrasound the lateral ventricle size, nuchal fold, femur and humerus length were all within normal limits.  We discussed that the incomplete views of the heart and profile means that several other possible markers were not assessed, but not being able to visualize something does not mean that there is anything concerning with that structure.  For these structures, it is as if Waqar has not had any assessment at all to this point.  Waqar reports understanding that based on all of the information currently available, her pregnancy would still be considered low risk for Down syndrome based on her negative NIPT result.  She also reports being extremely anxious to learn more information and is anxiously awaiting her follow up ultrasound.  I provided emotional support for Waqar during this period of increased stress and encouraged her to continue to reach out to our clinic for support or information as needed.      Vadim Moreno MS, [facility]  Licensed Genetic Counselor  Phone: [phone]  Pager: [phone]

## 2018-02-21 ENCOUNTER — TELEPHONE (OUTPATIENT)
Dept: NURSING | Facility: CLINIC | Age: 38
End: 2018-02-21

## 2018-02-21 NOTE — TELEPHONE ENCOUNTER
Pt is having lower abd pain. States pain is across bottom of stomach. Pain is described as discomfort. Pt unable to state if constant or comes and goes. Pt denies vaginal bleeding or LOF. Pt thinks she is felt FM yesterday, but not sure.  Abd does not feel hard or tender spot on abd. Has had BM last one diarrhea. Denies UTI symptoms. States does have some pain if holds urine too long. Denies burning. Pt informed could be round ligament pain. Encouraged pt to keep well hydrated, can take tylenol for discomfort. Pt could also take warm bath or use just warm heating pad not directly to abd but on side. Keep bladder empty and call if not better in the next day or so. No further questions. Routing to Dr. Giang to review.

## 2018-02-21 NOTE — TELEPHONE ENCOUNTER
Nothing further to add without any concern for  labor, contractions, vag bleeding or spotting.  Will have growing pains throughout the remainder of the pregnancy that will come and go.

## 2018-03-15 ENCOUNTER — HOSPITAL ENCOUNTER (OUTPATIENT)
Dept: ULTRASOUND IMAGING | Facility: CLINIC | Age: 38
Discharge: HOME | End: 2018-03-15
Attending: OBSTETRICS & GYNECOLOGY | Admitting: OBSTETRICS & GYNECOLOGY
Payer: COMMERCIAL

## 2018-03-15 ENCOUNTER — OFFICE VISIT (OUTPATIENT)
Dept: MATERNAL FETAL MEDICINE | Facility: CLINIC | Age: 38
End: 2018-03-15
Attending: OBSTETRICS & GYNECOLOGY
Payer: COMMERCIAL

## 2018-03-15 ENCOUNTER — HOSPITAL ENCOUNTER (OUTPATIENT)
Dept: ULTRASOUND IMAGING | Facility: CLINIC | Age: 38
End: 2018-03-15
Attending: OBSTETRICS & GYNECOLOGY
Payer: COMMERCIAL

## 2018-03-15 DIAGNOSIS — O09.812 PREGNANCY RESULTING FROM IN VITRO FERTILIZATION IN SECOND TRIMESTER: Primary | ICD-10-CM

## 2018-03-15 DIAGNOSIS — O09.812 PREGNANCY RESULTING FROM ASSISTED REPRODUCTIVE TECHNOLOGY IN SECOND TRIMESTER: ICD-10-CM

## 2018-03-15 DIAGNOSIS — O43.192 MARGINAL INSERTION OF UMBILICAL CORD AFFECTING MANAGEMENT OF MOTHER IN SECOND TRIMESTER: ICD-10-CM

## 2018-03-15 DIAGNOSIS — Z03.73 SUSPECTED FETAL ANOMALY NOT FOUND: ICD-10-CM

## 2018-03-15 DIAGNOSIS — O43.199 MARGINAL INSERTION OF UMBILICAL CORD AFFECTING MANAGEMENT OF MOTHER: ICD-10-CM

## 2018-03-15 DIAGNOSIS — O35.EXX0 PYELECTASIS OF FETUS ON PRENATAL ULTRASOUND: ICD-10-CM

## 2018-03-15 PROCEDURE — 76827 ECHO EXAM OF FETAL HEART: CPT

## 2018-03-15 PROCEDURE — 76816 OB US FOLLOW-UP PER FETUS: CPT

## 2018-03-15 NOTE — PROGRESS NOTES
"Please see \"Imaging\" tab under \"Chart Review\" for details of today's ultrasound.    Francesco Orozco M.D.  Specialist in Maternal-Fetal Medicine     "

## 2018-03-15 NOTE — MR AVS SNAPSHOT
"              After Visit Summary   3/15/2018    Waqar Hogue    MRN: [id number]           Patient Information     Date Of Birth          1980        Visit Information        Provider Department      3/15/2018 9:15 AM Francesco Orozco MD [facility] Maternal Fetal Medicine [facility]        Today's Diagnoses     Pregnancy resulting from in vitro fertilization in second trimester    -  1    Marginal insertion of umbilical cord affecting management of mother in second trimester           Follow-ups after your visit        Who to contact     If you have questions or need follow up information about today's clinic visit or your schedule please contact [facility] MATERNAL FETAL MEDICINE [facility] directly at [phone].  Normal or non-critical lab and imaging results will be communicated to you by Wevebobhart, letter or phone within 4 business days after the clinic has received the results. If you do not hear from us within 7 days, please contact the clinic through Wevebobhart or phone. If you have a critical or abnormal lab result, we will notify you by phone as soon as possible.  Submit refill requests through Nirvanix or call your pharmacy and they will forward the refill request to us. Please allow 3 business days for your refill to be completed.          Additional Information About Your Visit        MyChart Information     Nirvanix lets you send messages to your doctor, view your test results, renew your prescriptions, schedule appointments and more. To sign up, go to [email or web address] . Click on \"Log in\" on the left side of the screen, which will take you to the Welcome page. Then click on \"Sign up Now\" on the right side of the page.     You will be asked to enter the access code listed below, as well as some personal information. Please follow the directions to create your username and password.     Your access code is: [email or web address]  Expires: 3/20/2018  4:42 PM     Your access code will  in 90 days. " If you need help or a new code, please call your [place] clinic or [phone].        Care EveryWhere ID     This is your Care EveryWhere ID. This could be used by other organizations to access your [place] medical records  [id number]         Blood Pressure from Last 3 Encounters:   02/07/18 116/72   01/09/18 118/72   12/20/17 102/60    Weight from Last 3 Encounters:   02/07/18 62.6 kg (138 lb)   01/09/18 59.9 kg (132 lb)   12/20/17 59.1 kg (130 lb 6.4 oz)              Today, you had the following     No orders found for display       Primary Care Provider Office Phone # Fax #    [facility] [place] [phone]       [facility] [address]  [facility] [address]  [facility] [address]        Equal Access to Services     LUIS ANTONIO BARON : Hadii aad ku hadasho Sosandy, waaxda luqadaha, qaybta kaalmada adeegyada, dalton moran . So [facility] [phone].    ATENCIÓN: Si habla español, tiene a contreras disposición servicios gratuitos de asistencia lingüística. David al [phone].    We comply with applicable federal civil rights laws and [place] laws. We do not discriminate on the basis of race, color, national origin, age, disability, sex, sexual orientation, or gender identity.            Thank you!     Thank you for choosing [facility]  for your care. Our goal is always to provide you with excellent care. Hearing back from our patients is one way we can continue to improve our services. Please take a few minutes to complete the written survey that you may receive in the mail after your visit with us. Thank you!             Your Updated Medication List - Protect others around you: Learn how to safely use, store and throw away your medicines at www.disposemymeds.org.          This list is accurate as of 3/15/18 10:12 AM.  Always use your most recent med list.                   Brand Name Dispense Instructions for  use Diagnosis    clindamycin 1 % solution    CLEOCIN-T    60 mL    Apply topically 2 times daily    Acne vulgaris       FOLIC ACID PO      Take 1 mg by mouth daily        prenatal multivitamin plus iron 27-0.8 MG Tabs per tablet      Take 1 tablet by mouth daily

## 2018-03-24 ENCOUNTER — OFFICE VISIT (OUTPATIENT)
Dept: URGENT CARE | Facility: URGENT CARE | Age: 38
End: 2018-03-24
Payer: COMMERCIAL

## 2018-03-24 VITALS
OXYGEN SATURATION: 100 % | SYSTOLIC BLOOD PRESSURE: 110 MMHG | DIASTOLIC BLOOD PRESSURE: 64 MMHG | WEIGHT: 147.5 LBS | RESPIRATION RATE: 20 BRPM | BODY MASS INDEX: 22.43 KG/M2 | TEMPERATURE: 97.6 F | HEART RATE: 76 BPM

## 2018-03-24 DIAGNOSIS — J06.9 VIRAL UPPER RESPIRATORY TRACT INFECTION: Primary | ICD-10-CM

## 2018-03-24 PROCEDURE — 99203 OFFICE O/P NEW LOW 30 MIN: CPT | Performed by: PHYSICIAN ASSISTANT

## 2018-03-24 NOTE — MR AVS SNAPSHOT
"              After Visit Summary   3/24/2018    Waqar Hogue    MRN: [id number]           Patient Information     Date Of Birth          1980        Visit Information        Provider Department      3/24/2018 7:15 PM Bryanna Piña PA-C [facility]        Today's Diagnoses     Viral upper respiratory tract infection    -  1       Follow-ups after your visit        Who to contact     If you have questions or need follow up information about today's clinic visit or your schedule please contact [facility] directly at [phone].  Normal or non-critical lab and imaging results will be communicated to you by MyChart, letter or phone within 4 business days after the clinic has received the results. If you do not hear from us within 7 days, please contact the clinic through Empower RF Systemshart or phone. If you have a critical or abnormal lab result, we will notify you by phone as soon as possible.  Submit refill requests through Uniken Systems or call your pharmacy and they will forward the refill request to us. Please allow 3 business days for your refill to be completed.          Additional Information About Your Visit        MyChart Information     Uniken Systems lets you send messages to your doctor, view your test results, renew your prescriptions, schedule appointments and more. To sign up, go to [email or web address] . Click on \"Log in\" on the left side of the screen, which will take you to the Welcome page. Then click on \"Sign up Now\" on the right side of the page.     You will be asked to enter the access code listed below, as well as some personal information. Please follow the directions to create your username and password.     Your access code is: [email or web address]  Expires: 2018  7:53 PM     Your access code will  in 90 days. If you need help or a new code, please call your [place] clinic or [phone].        Care EveryWhere ID     This is your " Care EveryWhere ID. This could be used by other organizations to access your [place] medical records  [id number]        Your Vitals Were     Pulse Temperature Respirations Pulse Oximetry BMI (Body Mass Index)       76 97.6  F (36.4  C) (Oral) 20 100% 22.43 kg/m2        Blood Pressure from Last 3 Encounters:   03/24/18 110/64   02/07/18 116/72   01/09/18 118/72    Weight from Last 3 Encounters:   03/24/18 147 lb 8 oz (66.9 kg)   02/07/18 138 lb (62.6 kg)   01/09/18 132 lb (59.9 kg)              Today, you had the following     No orders found for display       Primary Care Provider Office Phone # Fax #    [facility] [phone]       [facility] [address]  [facility] [address]  [facility] [address]        Equal Access to Services     LUIS ANTONIO BARON : Hadii aad ku hadasho Sorkali, waaxda luqadaha, qaybta kaalmada adeegyada, dalton moran . So [facility] [phone].    ATENCIÓN: Si habla español, tiene a contreras disposición servicios gratuitos de asistencia lingüística. David al [phone].    We comply with applicable federal civil rights laws and [place] laws. We do not discriminate on the basis of race, color, national origin, age, disability, sex, sexual orientation, or gender identity.            Thank you!     Thank you for choosing [facility]  for your care. Our goal is always to provide you with excellent care. Hearing back from our patients is one way we can continue to improve our services. Please take a few minutes to complete the written survey that you may receive in the mail after your visit with us. Thank you!             Your Updated Medication List - Protect others around you: Learn how to safely use, store and throw away your medicines at www.disposemymeds.org.          This list is accurate as of 3/24/18  7:53 PM.  Always use your most recent med list.                   Brand Name Dispense  Instructions for use Diagnosis    clindamycin 1 % solution    CLEOCIN-T    60 mL    Apply topically 2 times daily    Acne vulgaris       FOLIC ACID PO      Take 1 mg by mouth daily        prenatal multivitamin plus iron 27-0.8 MG Tabs per tablet      Take 1 tablet by mouth daily

## 2018-03-24 NOTE — LETTER
[facility]  [address] [address]  Phone: [phone]    March 24, 2018        Waqar Hogue  [address] [facility] [address]          To whom it may concern:    RE: Waqar Hogue    Patient was seen and treated today at our clinic.     Please contact me for questions or concerns.      Sincerely,        Bryanna Piña PA-C

## 2018-03-25 NOTE — NURSING NOTE
"Chief Complaint   Patient presents with     Pharyngitis     st for cough,cough for 2 days     Cough       Initial /64 (Cuff Size: Adult Regular)  Pulse 76  Temp 97.6  F (36.4  C) (Oral)  Resp 20  Wt 147 lb 8 oz (66.9 kg)  SpO2 100%  BMI 22.43 kg/m2 Estimated body mass index is 22.43 kg/(m^2) as calculated from the following:    Height as of 12/20/17: 5' 8\" (1.727 m).    Weight as of this encounter: 147 lb 8 oz (66.9 kg).  Medication Reconciliation: complete Apple CLAIRE    "

## 2018-03-25 NOTE — PROGRESS NOTES
SUBJECTIVE:   Waqar Hogue is a 37 year old female presenting with a chief complaint of runny nose, cough, dry eyes and itchy throat.  Onset of symptoms was 2 day(s) ago.  Course of illness is same.    Severity moderately severe  Current and Associated symptoms: itchy throat  Treatment measures tried include None tried.  Predisposing factors include possible seasonal allergies, patient is 24 weeks pregnant.    Past Medical History:   Diagnosis Date     History of recurrent miscarriages 2017    SAB 7wks 1d (summer 2017) no chromosomal abnormalities  had D&C, prior to 2007, early on, no management needed to be done     Infertility, female     followed by [facility]; s/p IVF x 2     Current Outpatient Prescriptions   Medication Sig Dispense Refill     clindamycin (CLEOCIN-T) 1 % solution Apply topically 2 times daily 60 mL 3     Prenatal Vit-Fe Fumarate-FA (PRENATAL MULTIVITAMIN PLUS IRON) 27-0.8 MG TABS per tablet Take 1 tablet by mouth daily       FOLIC ACID PO Take 1 mg by mouth daily       Social History   Substance Use Topics     Smoking status: Never Smoker     Smokeless tobacco: Never Used     Alcohol use No      Comment: not since pg     ROS:  C: NEGATIVE for fever, chills, change in weight  INTEGUMENTARY/SKIN: NEGATIVE for worrisome rashes, moles or lesions  E/M: SEE HPI  R: SEE HPI  CV: NEGATIVE for chest pain, palpitations or peripheral edema  GI: NEGATIVE for nausea, abdominal pain, heartburn, or change in bowel habits  : NEGATIVE for dysuria, hematuria, decreased urinary stream or discharge  MUSCULOSKELETAL: NEGATIVE for significant arthralgias or myalgia  NEURO: NEGATIVE for weakness, dizziness or paresthesias  ENDOCRINE: NEGATIVE for cold intolerance, HX diabetes and HX thyroid disease  HEME/ALLERGY/IMMUNE: NEGATIVE for swollen nodes      OBJECTIVE:  /64 (Cuff Size: Adult Regular)  Pulse 76  Temp 97.6  F (36.4  C) (Oral)  Resp 20  Wt 147 lb 8 oz (66.9 kg)  SpO2 100%  BMI 22.43  kg/m2  GENERAL APPEARANCE: healthy, alert and no distress  EYES: EOMI,  PERRL, conjunctiva clear  HENT: TM's normal bilaterally. Nose clear. Throat with erythema and mild cobblestoning.   NECK: supple, nontender, no lymphadenopathy  RESP: lungs clear to auscultation - no rales, rhonchi or wheezes  CV: regular rates and rhythm, normal S1 S2, no murmur noted  ABDOMEN:  soft, nontender, no HSM or masses and bowel sounds normal  NEURO: Normal strength and tone, sensory exam grossly normal,  normal speech and mentation  SKIN: no suspicious lesions or rashes    ASSESSMENT/PLAN:  1. Viral upper respiratory tract infection  Push fluids and rest. Honey for cough, humidified air at night.   Claritin daily     Bryanna Piña PA-C

## 2018-03-26 ENCOUNTER — HOSPITAL ENCOUNTER (EMERGENCY)
Facility: CLINIC | Age: 38
Discharge: HOME | End: 2018-03-26
Attending: NURSE PRACTITIONER | Admitting: NURSE PRACTITIONER
Payer: COMMERCIAL

## 2018-03-26 VITALS
BODY MASS INDEX: 21.98 KG/M2 | DIASTOLIC BLOOD PRESSURE: 68 MMHG | SYSTOLIC BLOOD PRESSURE: 114 MMHG | TEMPERATURE: 98.6 F | OXYGEN SATURATION: 98 % | HEIGHT: 68 IN | RESPIRATION RATE: 20 BRPM | WEIGHT: 145 LBS

## 2018-03-26 DIAGNOSIS — J06.9 URI WITH COUGH AND CONGESTION: ICD-10-CM

## 2018-03-26 DIAGNOSIS — R07.0 THROAT PAIN: ICD-10-CM

## 2018-03-26 PROCEDURE — 99283 EMERGENCY DEPT VISIT LOW MDM: CPT

## 2018-03-26 RX ORDER — CLINDAMYCIN PHOSPHATE 10 UG/ML
LOTION TOPICAL
Refills: 10 | Status: ON HOLD | COMMUNITY
Start: 2018-03-06 | End: 2020-09-26

## 2018-03-26 ASSESSMENT — ENCOUNTER SYMPTOMS
COUGH: 1
SORE THROAT: 1
FEVER: 0
SLEEP DISTURBANCE: 1
RHINORRHEA: 1

## 2018-03-26 NOTE — ED AVS SNAPSHOT
Emergency Department    [address] [facility] [address]    Phone:  [phone]    Fax:  [phone]                                       Waqar Hogue   MRN: [id number]    Department:   Emergency Department   Date of Visit:  3/26/2018           Patient Information     Date Of Birth          1980        Your diagnoses for this visit were:     URI with cough and congestion     Throat pain        You were seen by Brina Mackenzie, CNP.      Follow-up Information     Follow up with Clinic, [facility] In 1 day.    Contact information:    [facility]  [address]  [facility] [address] [phone]          Follow up with  Emergency Department.    Specialty:  EMERGENCY MEDICINE    Why:  As needed, If symptoms worsen    Contact information:    [address] [facility] [id number] [phone]        Discharge Instructions       For your sore throat you may try: Cepacol Lozenges, Sucrets lozenges, chloraseptic lozenges or spray, halls lozenges, robitussin lozenges    Discharge Instructions  Upper Respiratory Infection    The upper respiratory tract includes the sinuses, nasal passages, pharynx, and larynx. A URI, or upper respiratory infection, is an infection of any of the parts of the upper airway. Symptoms include runny nose, congestion, sneezing, sore throat, cough, and fever. URIs are almost always caused by a virus. Antibiotics do not help with viral infections, so are generally not prescribed. A URI is very contagious through coughing and nasal secretions; make sure you wash your hands often and clean surfaces after sneezing, coughing or touching them. While you should start to improve in 3 - 5 days, remember that sometimes a cough can linger for several weeks.    Generally, every Emergency Department visit should have a follow-up clinic visit with either a primary or a specialty clinic/provider. Please  follow-up as instructed by your emergency provider today.    Return to the Emergency Department if:    Any of your symptoms get much worse.    You seem very sick, like being too weak to get up.    You have chest pain or shortness of breath.     You have a severe headache.    You are vomiting (throwing up) so much you cannot keep fluids or medicines down.    You have confusion or seem unusually drowsy.    You have a seizure.    What can I do to help myself?    Fill any prescriptions the provider gave you and take them right away    If you have a fever, get plenty of rest and drink lots of fluids, especially water.    Using a humidifier or saline nose spray will also help loosen mucous.     Clothes or blankets will not change your fever. Do what is comfortable for you.    Bathing or sponging in lukewarm water may help you feel better.    Acetaminophen (Tylenol ) or ibuprofen (Advil , Motrin ) will help bring fever down and may help you feel more comfortable. Be sure to read and follow the package directions, and ask your provider if you have questions.    Do not drink alcohol.    Decongestants may help you feel better. You may use decongestant nose sprays Afrin  (oxymetazoline) or Roman-Synephrine  (phenylephrine hydrochloride) for up to 3 days, or may use a decongestant tablet like Sudafed  (pseudoephedrine).  If you were given a prescription for medicine here today, be sure to read all of the information (including the package insert) that comes with your prescription.  This will include important information about the medicine, its side effects, and any warnings that you need to know about.  The pharmacist who fills the prescription can provide more information and answer questions you may have about the medicine.  If you have questions or concerns that the pharmacist cannot address, please call or return to the Emergency Department.   Remember that you can always come back to the Emergency Department if you are not  able to see your regular provider in the amount of time listed above, if you get any new symptoms, or if there is anything that worries you.        Discharge References/Attachments     SORE THROAT, WHEN YOU HAVE A (ENGLISH)      24 Hour Appointment Hotline       To make an appointment at any [facility], call [phone] ([phone]). If you don't have a family doctor or clinic, we will help you find one. [facility] are conveniently located to serve the needs of you and your family.             Review of your medicines      Our records show that you are taking the medicines listed below. If these are incorrect, please call your family doctor or clinic.        Dose / Directions Last dose taken    * clindamycin 1 % solution   Commonly known as:  CLEOCIN-T   Quantity:  60 mL        Apply topically 2 times daily   Refills:  3        * clindamycin 1 % lotion   Commonly known as:  CLEOCIN T        APPLY TO AFFECTED AREAS OF ACNE D   Refills:  10        FOLIC ACID PO   Dose:  1 mg        Take 1 mg by mouth daily   Refills:  0        prenatal multivitamin plus iron 27-0.8 MG Tabs per tablet   Dose:  1 tablet        Take 1 tablet by mouth daily   Refills:  0        * Notice:  This list has 2 medication(s) that are the same as other medications prescribed for you. Read the directions carefully, and ask your doctor or other care provider to review them with you.            Orders Needing Specimen Collection     None      Pending Results     No orders found from 3/24/2018 to 3/27/2018.            Pending Culture Results     No orders found from 3/24/2018 to 3/27/2018.            Pending Results Instructions     If you had any lab results that were not finalized at the time of your Discharge, you can call the ED Lab Result RN at [phone]. You will be contacted by this team for any positive Lab results or changes in treatment. The nurses are available 7 days a week from 10A to 6:30P.  You can leave a message  24 hours per day and they will return your call.        Test Results From Your Hospital Stay               Clinical Quality Measure: Blood Pressure Screening     Your blood pressure was checked while you were in the emergency department today. The last reading we obtained was  BP: 117/76 . Please read the guidelines below about what these numbers mean and what you should do about them.  If your systolic blood pressure (the top number) is less than 120 and your diastolic blood pressure (the bottom number) is less than 80, then your blood pressure is normal. There is nothing more that you need to do about it.  If your systolic blood pressure (the top number) is 120-139 or your diastolic blood pressure (the bottom number) is 80-89, your blood pressure may be higher than it should be. You should have your blood pressure rechecked within a year by a primary care provider.  If your systolic blood pressure (the top number) is 140 or greater or your diastolic blood pressure (the bottom number) is 90 or greater, you may have high blood pressure. High blood pressure is treatable, but if left untreated over time it can put you at risk for heart attack, stroke, or kidney failure. You should have your blood pressure rechecked by a primary care provider within the next 4 weeks.  If your provider in the emergency department today gave you specific instructions to follow-up with your doctor or provider even sooner than that, you should follow that instruction and not wait for up to 4 weeks for your follow-up visit.        Thank you for choosing [facility]       Thank you for choosing [facility] for your care. Our goal is always to provide you with excellent care. Hearing back from our patients is one way we can continue to improve our services. Please take a few minutes to complete the written survey that you may receive in the mail after you visit with us. Thank you!        [employer] Information     [employer] lets you send messages to  "your doctor, view your test results, renew your prescriptions, schedule appointments and more. To sign up, go to [email or web address] . Click on \"Log in\" on the left side of the screen, which will take you to the Welcome page. Then click on \"Sign up Now\" on the right side of the page.     You will be asked to enter the access code listed below, as well as some personal information. Please follow the directions to create your username and password.     Your access code is: [email or web address]  Expires: 2018  7:53 PM     Your access code will  in 90 days. If you need help or a new code, please call your [facility] clinic or [phone].        Care EveryWhere ID     This is your Care EveryWhere ID. This could be used by other organizations to access your [facility] medical records  [id number]        Equal Access to Services     [facility]: Hadjill Li, romina canchola, jennifer rouse, dalton moran . So [facility] [phone].    ATENCIÓN: Si habla español, tiene a contreras disposición servicios gratuitos de asistencia lingüística. Llame al [phone].    We comply with applicable federal civil rights laws and [place] laws. We do not discriminate on the basis of race, color, national origin, age, disability, sex, sexual orientation, or gender identity.            After Visit Summary       This is your record. Keep this with you and show to your community pharmacist(s) and doctor(s) at your next visit.                  "

## 2018-03-26 NOTE — ED AVS SNAPSHOT
Emergency Department    [address] [place] [address]    Phone:  [phone]    Fax:  [phone]                                       Waqar Hogue   MRN: [id number]    Department:   Emergency Department   Date of Visit:  3/26/2018           After Visit Summary Signature Page     I have received my discharge instructions, and my questions have been answered. I have discussed any challenges I see with this plan with the nurse or doctor.    ..........................................................................................................................................  Patient/Patient Representative Signature      ..........................................................................................................................................  Patient Representative Print Name and Relationship to Patient    ..................................................               ................................................  Date                                            Time    ..........................................................................................................................................  Reviewed by Signature/Title    ...................................................              ..............................................  Date                                                            Time

## 2018-03-27 NOTE — ED NOTES
Fetal heart rate using Doppler = 140 bpm. CNP notified results upon completion.  Eusebia Zavala RN,.......................................... 3/26/2018   10:10 PM

## 2018-03-27 NOTE — ED NOTES
CNP at bedside.  Eusebia Zavala RN,.......................................... 3/26/2018   9:47 PM

## 2018-03-27 NOTE — ED PROVIDER NOTES
History   Chief Complaint:  Cough     HPI   Waqar Hogue is a 37 year old female who presents to the emergency department today for evaluation of a cough. The patient reports that last week she felt muscle aches for a few days. Then, last Wednesday (03/21/2018) she states that her throat became very itchy and she developed sinus congestion. She notes it has been getting progressively worse since then, she has multiple coughing fits every night and has had trouble sleeping, she is congested, she has a sore throat. She states that her cough is so bad that it hurts to talk and she has been coughing up yellow green phlegm and clear drainage from her nose. Here in the ED, she reports a slight headache. The patient contacted her OB on Saturday (03/24/2018) and was told to go to urgent care where she was diagnosed with a viral upper respiratory infection. She notes her OB advised taking robitussin, which did not help her symptoms. Then on Sunday, she states she spoke to her family doctor and due to the phlegm was prescribe amoxicillin, she has taken three doses. She denies any abdominal or vaginal cramping or vaginal bleeding. She denies any fevers, chest pain, shortness of breath. She does say her baby was more mobile than usual earlier today, to the point of waking her up but this evening her baby seems to have settled down.     Allergies:  No Known Drug Allergies     Medications:    Clindamycin  Prenatal Multivitamin  Folic Acid    Past Medical History:    History of recurrent miscarriages  Infertility    Past Surgical History:    Gyn Surgery    Family History:    Mother: Breast Cancer    Social History:  The patient was accompanied to the ED by her .  Smoking Status: Never Smoker  Smokeless Tobacco: Never Used  Alcohol Use: Negative  Marital Status:       Review of Systems   Constitutional: Negative for fever.   HENT: Positive for congestion, postnasal drip, rhinorrhea and sore throat.   "  Respiratory: Positive for cough.    Genitourinary: Negative for vaginal bleeding.   Psychiatric/Behavioral: Positive for sleep disturbance.   All other systems reviewed and are negative.      Physical Exam     Patient Vitals for the past 24 hrs:   BP Temp Temp src Heart Rate Resp SpO2 Height Weight   03/26/18 2131 117/76 98.6  F (37  C) Oral 96 20 98 % 1.727 m (5' 8\") 65.8 kg (145 lb)     Physical Exam  Nursing notes reviewed. Vitals reviewed.  General: Alert. Well kept.  Eyes:  Conjunctiva non-injected, non-icteric.  Ears:TM s normal.  Neck/Throat: Moist mucous membranes, oropharynx with cobblestoning. No submandibular, submental, cervical or supraclavicular lymphadenopathy.  Normal voice. No trismus.  No nuchal rigidity.   Cardiac: Regular rhythm. Normal heart sounds with no murmur/rubs/click.   Pulmonary: Clear and equal breath sounds bilaterally. No crackles/rales. No wheezing. Speaking in full clear sentences.   Musculoskeletal: Normal gross range of motion of all 4 extremities.    Neurological: Alert and oriented x4.   Skin: Warm and dry without rashes or petechiae. Normal appearance of visualized exposed skin.  Psych: Affect normal. Good eye contact.    Emergency Department Course   Emergency Department Course:    2130 Nursing notes and vitals reviewed.    2140 I performed an exam of the patient as documented above.     2210  Fetal heart rate using Doppler = 140 bpm    15706 I personally reviewed the physical exam results with the patient and answered all related questions prior to discharge.    Impression & Plan      Medical Decision Making:  Waqar Hogue is a 37 year old female who presents with symptoms consistent with viral URI. The patient appears well and nontoxic. There is no clinical evidence of dehydration. There is no evidence of any respiratory distress. The patient has normal oxygen saturations with normal work of breathing and is able to speak in full clear sentences.  A chest x-ray is " not indicated considering no tachycardia and no tachypnea or respiratory distress, no fevers, no rales on exam, no hypoxia, no wheezing.The patient has no significant underlying comorbid cardiopulmonary process including and is not immunocompromised. And at this time I find it appropriate she continue on the antibiotics previously prescribed by her primary provider and robitussin as prescribed by OB. I discussed symptomatic treatment including saline nasal spray, chloraseptic spray, and benadryl.  It was discussed that cough may persist for several weeks. Symptoms are not consistent with pulmonary embolus and she has no tachycardia, hypoxia, or shortness of breath.  I discussed the need to return for signs of respiratory distress, significant shortness of breath, confusion, if high fevers develop or for any other questions or concerns. No indication for ultrasound or emergent OB follow-up with normal movement of the baby and .  Primary clinic follow up in 1 week is recommended and she has an OB appointment scheduled on Wednesday.     Diagnosis:    ICD-10-CM    1. URI with cough and congestion J06.9    2. Throat pain R07.0      Disposition:   The patient is discharged to home.    Discharge Medications:  No discharge medications.     Scribe Disclosure:  Malia HAHN, am serving as a scribe at 9:33 PM on 3/26/2018 to document services personally performed by Brina Mackenzie CNP based on my observations and the provider's statements to me.     EMERGENCY DEPARTMENT       Brina Mackenzie CNP  03/26/18 2232       Brina Mackenzie CNP  03/26/18 2238

## 2018-03-27 NOTE — DISCHARGE INSTRUCTIONS
For your sore throat you may try: Cepacol Lozenges, Sucrets lozenges, chloraseptic lozenges or spray, halls lozenges, robitussin lozenges    Discharge Instructions  Upper Respiratory Infection    The upper respiratory tract includes the sinuses, nasal passages, pharynx, and larynx. A URI, or upper respiratory infection, is an infection of any of the parts of the upper airway. Symptoms include runny nose, congestion, sneezing, sore throat, cough, and fever. URIs are almost always caused by a virus. Antibiotics do not help with viral infections, so are generally not prescribed. A URI is very contagious through coughing and nasal secretions; make sure you wash your hands often and clean surfaces after sneezing, coughing or touching them. While you should start to improve in 3 - 5 days, remember that sometimes a cough can linger for several weeks.    Generally, every Emergency Department visit should have a follow-up clinic visit with either a primary or a specialty clinic/provider. Please follow-up as instructed by your emergency provider today.    Return to the Emergency Department if:    Any of your symptoms get much worse.    You seem very sick, like being too weak to get up.    You have chest pain or shortness of breath.     You have a severe headache.    You are vomiting (throwing up) so much you cannot keep fluids or medicines down.    You have confusion or seem unusually drowsy.    You have a seizure.    What can I do to help myself?    Fill any prescriptions the provider gave you and take them right away    If you have a fever, get plenty of rest and drink lots of fluids, especially water.    Using a humidifier or saline nose spray will also help loosen mucous.     Clothes or blankets will not change your fever. Do what is comfortable for you.    Bathing or sponging in lukewarm water may help you feel better.    Acetaminophen (Tylenol ) or ibuprofen (Advil , Motrin ) will help bring fever down and may help you  feel more comfortable. Be sure to read and follow the package directions, and ask your provider if you have questions.    Do not drink alcohol.    Decongestants may help you feel better. You may use decongestant nose sprays Afrin  (oxymetazoline) or Roman-Synephrine  (phenylephrine hydrochloride) for up to 3 days, or may use a decongestant tablet like Sudafed  (pseudoephedrine).  If you were given a prescription for medicine here today, be sure to read all of the information (including the package insert) that comes with your prescription.  This will include important information about the medicine, its side effects, and any warnings that you need to know about.  The pharmacist who fills the prescription can provide more information and answer questions you may have about the medicine.  If you have questions or concerns that the pharmacist cannot address, please call or return to the Emergency Department.   Remember that you can always come back to the Emergency Department if you are not able to see your regular provider in the amount of time listed above, if you get any new symptoms, or if there is anything that worries you.

## 2018-06-12 LAB — GROUP B STREP PCR: NEGATIVE

## 2018-06-27 ENCOUNTER — HOSPITAL ENCOUNTER (INPATIENT)
Facility: CLINIC | Age: 38
LOS: 5 days | Discharge: HOME | End: 2018-07-02
Attending: OBSTETRICS & GYNECOLOGY | Admitting: OBSTETRICS & GYNECOLOGY
Payer: MEDICAID

## 2018-06-27 PROBLEM — O41.00X0 OLIGOHYDRAMNIOS: Status: ACTIVE | Noted: 2018-06-27

## 2018-06-27 LAB
ABO + RH BLD: NORMAL
ABO + RH BLD: NORMAL
SPECIMEN EXP DATE BLD: NORMAL

## 2018-06-27 PROCEDURE — 25000132 ZZH RX MED GY IP 250 OP 250 PS 637: Performed by: OBSTETRICS & GYNECOLOGY

## 2018-06-27 PROCEDURE — 86780 TREPONEMA PALLIDUM: CPT | Performed by: OBSTETRICS & GYNECOLOGY

## 2018-06-27 PROCEDURE — 86901 BLOOD TYPING SEROLOGIC RH(D): CPT | Performed by: OBSTETRICS & GYNECOLOGY

## 2018-06-27 PROCEDURE — 36415 COLL VENOUS BLD VENIPUNCTURE: CPT | Performed by: OBSTETRICS & GYNECOLOGY

## 2018-06-27 PROCEDURE — 86900 BLOOD TYPING SEROLOGIC ABO: CPT | Performed by: OBSTETRICS & GYNECOLOGY

## 2018-06-27 PROCEDURE — 12000029 ZZH R&B OB INTERMEDIATE

## 2018-06-27 RX ORDER — ACETAMINOPHEN 325 MG/1
650 TABLET ORAL EVERY 4 HOURS PRN
Status: DISCONTINUED | OUTPATIENT
Start: 2018-06-27 | End: 2018-06-30

## 2018-06-27 RX ORDER — HYDROXYZINE HYDROCHLORIDE 50 MG/1
50 TABLET, FILM COATED ORAL ONCE
Status: COMPLETED | OUTPATIENT
Start: 2018-06-27 | End: 2018-06-27

## 2018-06-27 RX ORDER — METHYLERGONOVINE MALEATE 0.2 MG/ML
200 INJECTION INTRAVENOUS
Status: DISCONTINUED | OUTPATIENT
Start: 2018-06-27 | End: 2018-06-30

## 2018-06-27 RX ORDER — NALOXONE HYDROCHLORIDE 0.4 MG/ML
.1-.4 INJECTION, SOLUTION INTRAMUSCULAR; INTRAVENOUS; SUBCUTANEOUS
Status: DISCONTINUED | OUTPATIENT
Start: 2018-06-27 | End: 2018-06-30

## 2018-06-27 RX ORDER — SODIUM CHLORIDE, SODIUM LACTATE, POTASSIUM CHLORIDE, CALCIUM CHLORIDE 600; 310; 30; 20 MG/100ML; MG/100ML; MG/100ML; MG/100ML
INJECTION, SOLUTION INTRAVENOUS CONTINUOUS
Status: DISCONTINUED | OUTPATIENT
Start: 2018-06-27 | End: 2018-06-30

## 2018-06-27 RX ORDER — MISOPROSTOL 100 UG/1
25 TABLET ORAL EVERY 4 HOURS PRN
Status: DISCONTINUED | OUTPATIENT
Start: 2018-06-27 | End: 2018-06-27

## 2018-06-27 RX ORDER — TERBUTALINE SULFATE 1 MG/ML
0.25 INJECTION, SOLUTION SUBCUTANEOUS
Status: DISCONTINUED | OUTPATIENT
Start: 2018-06-27 | End: 2018-06-30

## 2018-06-27 RX ORDER — MISOPROSTOL 100 UG/1
25 TABLET ORAL
Status: DISCONTINUED | OUTPATIENT
Start: 2018-06-27 | End: 2018-06-30

## 2018-06-27 RX ORDER — ACETAMINOPHEN 325 MG/1
650 TABLET ORAL EVERY 4 HOURS PRN
Status: DISCONTINUED | OUTPATIENT
Start: 2018-06-27 | End: 2018-06-27

## 2018-06-27 RX ORDER — CARBOPROST TROMETHAMINE 250 UG/ML
250 INJECTION, SOLUTION INTRAMUSCULAR
Status: DISCONTINUED | OUTPATIENT
Start: 2018-06-27 | End: 2018-06-30

## 2018-06-27 RX ORDER — ONDANSETRON 2 MG/ML
4 INJECTION INTRAMUSCULAR; INTRAVENOUS EVERY 6 HOURS PRN
Status: DISCONTINUED | OUTPATIENT
Start: 2018-06-27 | End: 2018-06-30

## 2018-06-27 RX ORDER — OXYCODONE AND ACETAMINOPHEN 5; 325 MG/1; MG/1
1 TABLET ORAL
Status: DISCONTINUED | OUTPATIENT
Start: 2018-06-27 | End: 2018-06-30

## 2018-06-27 RX ORDER — IBUPROFEN 400 MG/1
800 TABLET, FILM COATED ORAL
Status: DISCONTINUED | OUTPATIENT
Start: 2018-06-27 | End: 2018-06-30

## 2018-06-27 RX ORDER — OXYTOCIN 10 [USP'U]/ML
10 INJECTION, SOLUTION INTRAMUSCULAR; INTRAVENOUS
Status: DISCONTINUED | OUTPATIENT
Start: 2018-06-27 | End: 2018-06-30

## 2018-06-27 RX ORDER — OXYTOCIN/0.9 % SODIUM CHLORIDE 30/500 ML
100-340 PLASTIC BAG, INJECTION (ML) INTRAVENOUS CONTINUOUS PRN
Status: COMPLETED | OUTPATIENT
Start: 2018-06-27 | End: 2018-06-30

## 2018-06-27 RX ADMIN — Medication 25 MCG: at 18:53

## 2018-06-27 RX ADMIN — Medication 25 MCG: at 16:47

## 2018-06-27 RX ADMIN — HYDROXYZINE HYDROCHLORIDE 50 MG: 50 TABLET, FILM COATED ORAL at 23:40

## 2018-06-27 NOTE — IP AVS SNAPSHOT
[address]    [place] [address]    Phone:  [phone]                                       After Visit Summary   6/27/2018    Waqar Hogue    MRN: [id number]           After Visit Summary Signature Page     I have received my discharge instructions, and my questions have been answered. I have discussed any challenges I see with this plan with the nurse or doctor.    ..........................................................................................................................................  Patient/Patient Representative Signature      ..........................................................................................................................................  Patient Representative Print Name and Relationship to Patient    ..................................................               ................................................  Date                                            Time    ..........................................................................................................................................  Reviewed by Signature/Title    ...................................................              ..............................................  Date                                                            Time

## 2018-06-27 NOTE — IP AVS SNAPSHOT
MRN:[id number]                      After Visit Summary   6/27/2018    Waqar Hogue    MRN: [id number]           Thank you!     Thank you for choosing [facility] for your care. Our goal is always to provide you with excellent care. Hearing back from our patients is one way we can continue to improve our services. Please take a few minutes to complete the written survey that you may receive in the mail after you visit with us. Thank you!        Patient Information     Date Of Birth          1980        Designated Caregiver       Most Recent Value    Caregiver    Name of designated caregiver Francesco Hogue    Phone number of caregiver [phone]      About your hospital stay     You were admitted on:  June 27, 2018 You last received care in the:  [address]    You were discharged on:  July 2, 2018        Reason for your hospital stay       Maternity care                  Who to Call     For medical emergencies, please call 911.  For non-urgent questions about your medical care, please call your primary care provider or clinic, [phone]          Attending Provider     Provider Specialty    Meghana Machado MD OB/Gyn    Danielle Albright MD OB/Gyn       Primary Care Provider Office Phone # Fax #    [facility] [phone]      After Care Instructions     Activity       Review discharge instructions            Diet       Resume previous diet            Discharge Instructions - Postpartum visit       Schedule postpartum visit at [facility] in 6 weeks                  Further instructions from your care team       Postpartum Vaginal Delivery Instructions    Activity       Ask family and friends for help when you need it.    Do not place anything in your vagina for 6 weeks.    You are not restricted on other activities, but take it easy for a few weeks to allow your body to recover from delivery.  You are able to do  any activities you feel up to that point.    No driving until you have stopped taking your pain medications (usually two weeks after delivery).     Call your health care provider if you have any of these symptoms:       Increased pain, swelling, redness, or fluid around your stiches from an episiotomy or perineal tear.    A fever above 100.4 F (38 C) with or without chills when placing a thermometer under your tongue.    You soak a sanitary pad with blood within 1 hour, or you see blood clots larger than a golf ball.    Bleeding that lasts more than 6 weeks.    Vaginal discharge that smells bad.    Severe pain, cramping or tenderness in your lower belly area.    A need to urinate more frequently (use the toilet more often), more urgently (use the toilet very quickly), or it burns when you urinate.    Nausea and vomiting.    Redness, swelling or pain around a vein in your leg.    Problems breastfeeding or a red or painful area on your breast.    Chest pain and cough or are gasping for air.    Problems coping with sadness, anxiety, or depression.  If you have any concerns about hurting yourself or the baby, call your provider immediately.     You have questions or concerns after you return home.     Keep your hands clean:  Always wash your hands before touching your perineal area and stitches.  This helps reduce your risk of infection.  If your hands aren't dirty, you may use an alcohol hand-rub to clean your hands. Keep your nails clean and short.    Congratulations!     Please call the office if you have  - vaginal bleeding soaking through more than a pad per hour  - blood clots greater than the size of geovanny  - temperature >100.4  - pain not controlled with oral medications  - inability to tolerate food and drink.  - persistent headache, vision changes, chest pain, shortness of breath, pain in your upper belly    Please make a postpartum appointment in the office in 6 weeks.     Pending Results     Date and Time  "Order Name Status Description    2018 0114 Placenta Path Order and Indications (PLACENTA) In process             Statement of Approval     Ordered          18 0812  I have reviewed and agree with all the recommendations and orders detailed in this document.  EFFECTIVE NOW     Approved and electronically signed by:  Danielle Albright MD             Admission Information     Date & Time Provider Department Dept. Phone    2018 Danielle Albright MD [address] [phone]      Your Vitals Were     Blood Pressure Pulse Temperature Respirations Height Weight    111/75 89 97.6  F (36.4  C) (Oral) 16 1.727 m (5' 8\") 79.8 kg (176 lb)    Pulse Oximetry BMI (Body Mass Index)                96% 26.76 kg/m2          MyChart Information     [employer] lets you send messages to your doctor, view your test results, renew your prescriptions, schedule appointments and more. To sign up, go to [email or web address] . Click on \"Log in\" on the left side of the screen, which will take you to the Welcome page. Then click on \"Sign up Now\" on the right side of the page.     You will be asked to enter the access code listed below, as well as some personal information. Please follow the directions to create your username and password.     Your access code is: [email or web address]  Expires: 2018  9:06 AM     Your access code will  in 90 days. If you need help or a new code, please call your [facility] clinic or [phone].        Care EveryWhere ID     This is your Care EveryWhere ID. This could be used by other organizations to access your [facility] medical records  [id number]        Equal Access to Services     [facility] : Hadii conchita Li, yasminda sushant, qaron kaalmada padma, dalton moran . So [facility] [phone].    ATENCIÓN: Si habla español, tiene a contreras disposición servicios gratuitos de asistencia lingüística. Llame al " [phone].    We comply with applicable federal civil rights laws and [place] laws. We do not discriminate on the basis of race, color, national origin, age, disability, sex, sexual orientation, or gender identity.               Review of your medicines      START taking        Dose / Directions    acetaminophen 325 MG tablet   Commonly known as:  TYLENOL        Dose:  650 mg   Take 2 tablets (650 mg) by mouth every 4 hours as needed for mild pain (greater than or equal to 38? C /100.4? F (oral) or 38.5? C/ 101.4? F (core).)   Quantity:  100 tablet   Refills:  0       ibuprofen 800 MG tablet   Commonly known as:  ADVIL/MOTRIN        Dose:  800 mg   Take 1 tablet (800 mg) by mouth every 6 hours as needed for other (cramping)   Quantity:  60 tablet   Refills:  0       oxyCODONE IR 5 MG tablet   Commonly known as:  ROXICODONE        Dose:  5 mg   Take 1 tablet (5 mg) by mouth every 4 hours as needed for moderate to severe pain   Quantity:  10 tablet   Refills:  0         CONTINUE these medicines which have NOT CHANGED        Dose / Directions    * clindamycin 1 % solution   Commonly known as:  CLEOCIN-T   Used for:  Acne vulgaris        Apply topically 2 times daily   Quantity:  60 mL   Refills:  3       * clindamycin 1 % lotion   Commonly known as:  CLEOCIN T        APPLY TO AFFECTED AREAS OF ACNE D   Refills:  10       prenatal multivitamin plus iron 27-0.8 MG Tabs per tablet        Dose:  1 tablet   Take 1 tablet by mouth daily   Refills:  0       * Notice:  This list has 2 medication(s) that are the same as other medications prescribed for you. Read the directions carefully, and ask your doctor or other care provider to review them with you.      STOP taking     FOLIC ACID PO                Where to get your medicines      Some of these will need a paper prescription and others can be bought over the counter. Ask your nurse if you have questions.     Bring a paper prescription for each of these medications      acetaminophen 325 MG tablet    ibuprofen 800 MG tablet    oxyCODONE IR 5 MG tablet                Protect others around you: Learn how to safely use, store and throw away your medicines at www.disposemymeds.org.        Information about OPIOIDS     PRESCRIPTION OPIOIDS: WHAT YOU NEED TO KNOW   We gave you an opioid (narcotic) pain medicine. It is important to manage your pain, but opioids are not always the best choice. You should first try all the other options your care team gave you. Take this medicine for as short a time (and as few doses) as possible.     These medicines have risks:    DO NOT drive when on new or higher doses of pain medicine. These medicines can affect your alertness and reaction times, and you could be arrested for driving under the influence (DUI). If you need to use opioids long-term, talk to your care team about driving.    DO NOT operate heave machinery    DO NOT do any other dangerous activities while taking these medicines.     DO NOT drink any alcohol while taking these medicines.      If the opioid prescribed includes acetaminophen, DO NOT take with any other medicines that contain acetaminophen. Read all labels carefully. Look for the word  acetaminophen  or  Tylenol.  Ask your pharmacist if you have questions or are unsure.    You can get addicted to pain medicines, especially if you have a history of addiction (chemical, alcohol or substance dependence). Talk to your care team about ways to reduce this risk.    Store your pills in a secure place, locked if possible. We will not replace any lost or stolen medicine. If you don t finish your medicine, please throw away (dispose) as directed by your pharmacist. The [place] Pollution Control Agency has more information about safe disposal: https://www.pca.state.[place]/living-green/managing-unwanted-medications.     All opioids tend to cause constipation. Drink plenty of water and eat foods that have a lot of fiber, such as fruits,  vegetables, prune juice, apple juice and high-fiber cereal. Take a laxative (Miralax, milk of magnesia, Colace, Senna) if you don t move your bowels at least every other day.              Medication List: This is a list of all your medications and when to take them. Check marks below indicate your daily home schedule. Keep this list as a reference.      Medications           Morning Afternoon Evening Bedtime As Needed    acetaminophen 325 MG tablet   Commonly known as:  TYLENOL   Take 2 tablets (650 mg) by mouth every 4 hours as needed for mild pain (greater than or equal to 38? C /100.4? F (oral) or 38.5? C/ 101.4? F (core).)   Last time this was given:  650 mg on 7/2/2018  5:14 AM                                * clindamycin 1 % solution   Commonly known as:  CLEOCIN-T   Apply topically 2 times daily                                * clindamycin 1 % lotion   Commonly known as:  CLEOCIN T   APPLY TO AFFECTED AREAS OF ACNE D                                ibuprofen 800 MG tablet   Commonly known as:  ADVIL/MOTRIN   Take 1 tablet (800 mg) by mouth every 6 hours as needed for other (cramping)   Last time this was given:  800 mg on 7/2/2018  8:09 AM                                oxyCODONE IR 5 MG tablet   Commonly known as:  ROXICODONE   Take 1 tablet (5 mg) by mouth every 4 hours as needed for moderate to severe pain   Last time this was given:  5 mg on 7/1/2018  5:23 AM                                prenatal multivitamin plus iron 27-0.8 MG Tabs per tablet   Take 1 tablet by mouth daily                                * Notice:  This list has 2 medication(s) that are the same as other medications prescribed for you. Read the directions carefully, and ask your doctor or other care provider to review them with you.              More Information        After Delivery: When to Call the Healthcare Provider  Health problems sometimes arise with you or your baby following delivery. Call your baby's healthcare provider or  your healthcare provider if you see any of the signs below.      Watch your baby for these signs  Call your baby s healthcare provider if your baby:    Has a fever (see Fever and children, below)    Has fewer than 6 wet diapers a day (Hint: Disposable diapers may feel heavy or hard after being soaked.)    Skin or whites of the eyes appear yellow    Cries for a long time, or if it sounds as if the cries are caused by pain    Has diarrhea    Refuses 2 feedings in a row    Is inactive or listless    Is vomiting    Has blood in the stool or vomit    Has a rash    Has ear drainage    Has trouble breathing    Has a seizure    Will not wake up  Trust your instincts. If you are concerned about your baby, call your baby's healthcare provider.  Fever and children  Always use a digital thermometer to check your child s temperature. Never use a mercury thermometer.  For infants and toddlers, be sure to use a rectal thermometer correctly. A rectal thermometer may accidentally poke a hole in (perforate) the rectum. It may also pass on germs from the stool. Always follow the product maker s directions for proper use. If you don t feel comfortable taking a rectal temperature, use another method. When you talk to your child s healthcare provider, tell him or her which method you used to take your child s temperature.  Here are guidelines for fever temperature. Ear temperatures aren t accurate before 6 months of age. Don t take an oral temperature until your child is at least 4 years old.  Infant under 3 months old:  Ask your child s healthcare provider how you should take the temperature.    Rectal or forehead (temporal artery) temperature of 100.4 F (38 C) or higher, or as directed by the provider   Watch your own health for these signs  Call your own healthcare provider if you have:    Burning or pain in your breasts    Red streaks or hard lumpy areas in your breasts    Problems with breastfeeding    A fever of 100.4 F (38 C) or  higher, or as directed by your healthcare provider    Extreme tiredness or body aches, as if you have the flu    Feelings of extreme sadness or anxiety, or a feeling that you don t want to be with your baby    Abdominal pain that isn t relieved with medicine    Vaginal discharge that has a bad odor    Vaginal bleeding that soaks more than one pad per hour    If you had a  section, call for concerns about your incision site such as pain, drainage, or bleeding from your incision  Date Last Reviewed: 2016-2017 [employer]. [address] [place], [place] [address]. All rights reserved. This information is not intended as a substitute for professional medical care. Always follow your healthcare professional's instructions.                Breastfeeding: Caring for Yourself  When you have a new little person in your life, it s easy to forget about yourself. There are new demands on your time. There are also new responsibilities. But it s important to take care of yourself. This will help you take better care of your new baby.    Healthy habits  Here are some healthy tips:    Get exercise when you can. If you leak milk, it will help to nurse right before the activity.    Avoid smoking. Smoking is unhealthy for you and may cause you to make less milk. Secondhand smoke is also harmful to your baby.    Talk to your healthcare provider about alcohol, if you choose to drink.    When you re sick, tell your healthcare provider that you are breastfeeding. Few medicines and illnesses affect breastfeeding, but it is important to check.    Ask your healthcare provider before taking any prescription or over-the-counter medicines, herbs, or supplements.  Comfy clothes  Suggestions for being comfortable when breastfeeding include:    Find a comfortable nursing bra. Many women find underwire uncomfortable. Some stores offer on-site fittings. Ask your healthcare provider or nurse for a referral.    If  you have leaking milk, place breast pads inside your bra.    Choose an extra-supportive bra for exercise. Or you can wear two bras at the same time for more support.    Wear loose tops that can be lifted for breastfeeding. You can also buy clothes specially made for breastfeeding moms.  A note about sex  After delivery, it may take a while before your interest in sex returns. Share your feelings with your partner. Your healthcare provider will let you know when it is safe to resume having sex. When you re ready, know that:    There are several forms of birth control that can be used while breastfeeding. Ask your healthcare provider what to use for pregnancy prevention while you are nursing.    Breastfeeding hormones may cause vaginal dryness. Some women find using a water-based lubricant makes sex more comfortable.    Milk may let down when you are aroused. Applying pressure on the nipple, using breast pads, or a towel may help with this.  When to call your healthcare provider  Call your healthcare provider if:    You feel overwhelmed and don't know where to turn.    You feel very sad or don t want to be with your baby.    You feel like your baby cries all the time and won't be soothed    You are unable to exercise, or have sex, without discomfort.    You are unsure about a medicine, illness, or activity and its effect on breastfeeding.   Date Last Reviewed: 9/7/2015 2000-2017 [employer]. [address] [place] [address], [place] [address]. All rights reserved. This information is not intended as a substitute for professional medical care. Always follow your healthcare professional's instructions.

## 2018-06-27 NOTE — PROVIDER NOTIFICATION
06/27/18 1603   Provider Notification   Provider Name/Title Fly Albright   Method of Notification Phone   Notification Reason Other (Comment)  (order clarification)   Called MD regarding patient request for cervical ripening agent to be oral instead of vaginal.  Orders obtained for PO cytotec, ok for regular diet, ok to hold SVE since just done in clinic today per Dr Albright.  Dr Albright will round this corazon and then Dr Machado to follow tonight.

## 2018-06-27 NOTE — PLAN OF CARE
Patient at 37 weeks gestation here for cervical ripening for oligo.  Patient states understanding of admission reason and is agreeable to plan of care.  External monitors explained and applied with verbal consent.  Denies pain, contractions, leaking of fluid or bleeding.  Baby is active with category 1 tracing.

## 2018-06-28 LAB — T PALLIDUM AB SER QL: NONREACTIVE

## 2018-06-28 PROCEDURE — 25000125 ZZHC RX 250: Performed by: OBSTETRICS & GYNECOLOGY

## 2018-06-28 PROCEDURE — 25000132 ZZH RX MED GY IP 250 OP 250 PS 637: Performed by: OBSTETRICS & GYNECOLOGY

## 2018-06-28 PROCEDURE — 3E033VJ INTRODUCTION OF OTHER HORMONE INTO PERIPHERAL VEIN, PERCUTANEOUS APPROACH: ICD-10-PCS | Performed by: OBSTETRICS & GYNECOLOGY

## 2018-06-28 PROCEDURE — 12000029 ZZH R&B OB INTERMEDIATE

## 2018-06-28 PROCEDURE — 25000128 H RX IP 250 OP 636: Performed by: OBSTETRICS & GYNECOLOGY

## 2018-06-28 RX ORDER — OXYTOCIN/0.9 % SODIUM CHLORIDE 30/500 ML
1-24 PLASTIC BAG, INJECTION (ML) INTRAVENOUS CONTINUOUS
Status: DISCONTINUED | OUTPATIENT
Start: 2018-06-28 | End: 2018-06-30

## 2018-06-28 RX ORDER — LIDOCAINE 40 MG/G
CREAM TOPICAL
Status: DISCONTINUED | OUTPATIENT
Start: 2018-06-28 | End: 2018-06-30

## 2018-06-28 RX ADMIN — Medication 25 MCG: at 16:04

## 2018-06-28 RX ADMIN — Medication 25 MCG: at 07:10

## 2018-06-28 RX ADMIN — Medication 25 MCG: at 18:04

## 2018-06-28 RX ADMIN — SODIUM CHLORIDE, POTASSIUM CHLORIDE, SODIUM LACTATE AND CALCIUM CHLORIDE: 600; 310; 30; 20 INJECTION, SOLUTION INTRAVENOUS at 20:17

## 2018-06-28 RX ADMIN — Medication 25 MCG: at 09:11

## 2018-06-28 RX ADMIN — OXYTOCIN-SODIUM CHLORIDE 0.9% IV SOLN 30 UNIT/500ML 2 MILLI-UNITS/MIN: 30-0.9/5 SOLUTION at 20:25

## 2018-06-28 RX ADMIN — Medication 25 MCG: at 13:53

## 2018-06-28 RX ADMIN — Medication 25 MCG: at 11:46

## 2018-06-28 NOTE — H&P
"[facility]   LABOR ADMIT    Waqar Hogue MRN# [id number]  Age: 37 year old YOB: 1980    Date of Admission: 2018    [facility] care provider: [facility]     HPI: This is a 37 year old  at 37w0d presenting with for IOL due to new onset oligohydramnios. This is an IVF pregnancy with normal genetic testing. Her pregnancy has been uncomplicated. She has been having weekly BPPs in the office given IVF. SARIKA has been in normal range but today she had a BPP of 6/8 due to oligohydramnios with SARIKA of 2.56. NST was reactive. Last growth scan 18 showed EFW 6#7 oz. She is GBS neg.     # Pain Assessment:  Current Pain Score 2018   Patient currently in pain? yes   Pain score (0-10) -   Pain location Back   Pain descriptors Cramping         Past Medical History:  This patient has no significant past medical history     Past Surgical History:  This patient has no significant past surgical history     Social History:  This patient has no significant social history     Family History:  This patient has no significant family history     Allergies:  NKDA    Physical Exam:  /67  Pulse 76  Temp 98.6  F (37  C) (Oral)  Resp 16  Ht 1.727 m (5' 8\")  Wt 79.8 kg (176 lb)  BMI 26.76 kg/m2  Gen: NAD  Abd: soft, NT, ND, gravid  Ext: NT, nonedematous  SVE in the office this morning 0/50/-1    Upon admission, FHT was appropriate for GA and reactive. [place] did show some intermittent ctx.     Prenatal Labs:   Lab Results   Component Value Date    ABO A 2018    RH Pos 2018    AS negative 2017    HEPBANG negative 2017    TREPAB Negative 2017    HGB 12.7 2017       GBS Status:   Lab Results   Component Value Date    GBS negative 2018       Assessment:  This is a 37 year old  at 37w0d admitted to L&D for IOL in setting of new onset oligohydramnios at term.     Plan:  Admit to L&D.  Cytotec for cervical ripening. " Pt prefers oral route.   Pitocin and amniotomy when appropriate.  EFM appropriate and reactive  GBS neg  Anticipate .     Danielle Albright MD  18 (pt seen at 1630 upon admission)     Dr. Machado is on call overnight - discussed patient case and care plan with her. I will follow patient after 0700 18.

## 2018-06-28 NOTE — PROGRESS NOTES
"[facility]  Obstetrics    S: Pt comfortable, up in bathroom putting on makeup. Pt reports cramping overnight.    O: BP 96/51  Pulse 76  Temp 98.4  F (36.9  C) (Oral)  Resp 16  Ht 1.727 m (5' 8\")  Wt 79.8 kg (176 lb)  BMI 26.76 kg/m2  Gen: NAD  Abd: soft, NT, ND, gravid  SVE per RN at 0630 - unable to reach cervix, fetal head at 0 station    FHT: 150s/mod/+accels/no decels  [place]: quiet    A/P: 38 yo  @ 37+1 admitted for IOL due to new onset oligohydramnios  - Pt received two doses of oral cytotec yesterday at 1630 and 1830. At that time, as frequent ctx were apparent on toco, cytotec was discontinued. Overnight, intermittent ctx were sometimes noted, but the toco has been quiet for the past few hours. Pt reports only mild cramping throughout the night - toco may have been giving a false impression of degree of ctx. She recently received a third dose of cytotec approximately 11 hours after the last. This delay will have to be noted in any consideration of the length of the induction process. Discussed with the patient the option of Cook catheter if either no progress with cytotec or inability to give due to ctx activity. Discussed that this would require a very thorough exam in order to appropriately reach cervix thus wasn't the initial plan as her cervix remains high behind fetal head which is at a low station. Will check back in later today to readdress plan.  - FHT Cat I and reactive  - GBS neg    Danielle Albright MD  18 0800  "

## 2018-06-28 NOTE — PROVIDER NOTIFICATION
06/27/18 2233   Provider Notification   Provider Name/Title Dr Machado   Method of Notification Phone   Notification Reason Status Update;SVE   MD updated regarding SVE and contraction pattern, held cytotec.  Orders obtained to continue to hold cytotec unless contractions space per protocol, if bowden score greater than 8 at that time start pitocin.  Orders for Vistaril and tylenol obtained.

## 2018-06-28 NOTE — PROGRESS NOTES
"[facility]  Obstetrics    Sitestuardo Hogue  1980  [id number]    S: Some cramping.     O: /60  Pulse 73  Temp 98.5  F (36.9  C) (Oral)  Resp 16  Ht 1.727 m (5' 8\")  Wt 79.8 kg (176 lb)  BMI 26.76 kg/m2  Gen: NAD  Abd: soft, NT, ND, gravid  SVE: pt unable to relax sufficiently for thorough cervical exam - fetal head is low at 0 station and cervix is posterior behind fetal head so unable to reach cervix    FHT: 150s/mod/+accels/no decels  [place]: irregular ctx q2-6 mins    A/P: 39 yo  @ 37+1 admitted for IOL due to new onset oligohydramnios  - Cat I FHT  - GBS neg  - Induction: Pt received two doses of cytotec last night and then none for 12 hours despite infrequent ctx - essentially induction process started this morning with oral cytotec again started around 0700. She has received two doses of oral cytotec so far this morning. Discussed option to continue with oral cytotec or switch to vaginal route. Advised that I do not think a Cook catheter will be possible at this point given inability for her to relax during exam to even reach cervix. Did discuss the option of early epidural to help with relaxation and ability to perform cervical exams and may be able to insert a Cook catheter in that setting. She would like to continue with oral cytotec and ambulate as much as she can. Will proceed with this plan. Theoretically could continue the oral cytotec for 3 more doses after this since there was such a large gap between initial doses and those given this morning. Will continue to readdress plan throughout the day. Encouraged pt not to get frustrated, reminding her that this can be a lengthy process. FHT has been consistently Cat I and reactive so she was reassured from that standpoint.    Danielle Albright MD  18 1200    Update:  Ctx activity has increased, both on toco and per pt. On toco ~q3-4 mins. She was up walking for about an hour recently and now doing some relaxation " techniques with . Discussed I think we are going in a positive direction and that we can continue with oral cytotec. Discussed my thought process of considering this morning at the start of the induction process given the no intervention was performed for 12 hours overnight, and she agrees with this. FHT continues to be reassuring. Will continue to check on patient throughout the day.    Danielle Albright MD  6/28/18 5281

## 2018-06-28 NOTE — PLAN OF CARE
Problem: Patient Care Overview  Goal: Plan of Care/Patient Progress Review  Outcome: No Change  Vss, afebrile.  FHR stable.  Continues with oral cytotec every 2 hours.  Dr Albright in to see pt, did vaginal exam.  Dr Albright able to reach cervix.  Pt very uncomfortable with exams.  Pt feeling more regular contractions.  See MAR for Cytotec detailed times.   Family and  present and supportive at bedside.  Will continue to monitor and assess.

## 2018-06-28 NOTE — PLAN OF CARE
Pt continuing with oral cytotec induction for oligo.  FHR stable.  Ctx now noted q2-6min.  Palpate mild.  Denies bleeding or leaking of fluid.   and family present and supportive at bedside.  Dr Albright updated frequently by phone.  Will continue to monitor and assess.

## 2018-06-28 NOTE — PROGRESS NOTES
"[facility]  Obstetrics    Waqar Hogue  1980  [id number]    S: Feeling more cramping, is more uncomfortable. Feeling emotional about this process. She and her  asking if this induction is necessary,  confirming no provider would be okay with SARIKA 2.5.  asking about what signs of distress for the baby might be. They are wondering if a cervical check is necessary now.     O: /59  Pulse 73  Temp 98.6  F (37  C) (Oral)  Resp 16  Ht 1.727 m (5' 8\")  Wt 79.8 kg (176 lb)  BMI 26.76 kg/m2  Gen: NAD  Abd: soft, ND, NT, gravid  SVE: able to reach anterior lip of cervix at this point, unable to get around but likely about 1 cm/90/0    FHT: 140s/mod/+accels/no decels  [place]: ctx q2-4    A/P: 36 yo  @ 37+1 admitted for IOL due to new onset oligohydramnios at term.    Discussed again that oligohydramnios at this gestational age is an indication for delivery. Discussed we base all of our recommendations on research and evidence. Discussed research suggests that in this clinical situation when weighing risk to benefit ratio, babies do better when delivered rather than if management continues expectantly. Discussed with low fluid level, baby is unable to move and growth appropriately. Discussed this can lead to further fetal compromise. Discussed the BPPs she was doing weekly help us assess fetal status, looking at big movements, yael movements, practice breathing, and fluid level. Discussed the low fluid level suggests a problem with placental function. Discussed ultimately if the baby continued to exist in this very low fluid level, those other measures will become compromised. Discussed another assessment of the baby's wellbeing is watching on the heart rate monitor. Discussed when babies are not doing well, there can be bradycardia, tachycardia, decelerations, and/or decreased variability. Discussed the main goal of starting the process of induction when the " initial problem of low fluid is identified is to AVOID further compromise that could impact the baby long term. Discussed they worked hard to get pregnant and make it through this pregnancy and we want to continue to do everything we can to ensure delivery of a happy, healthy baby. Discussed I cannot predict what could happen to baby if we waited, but that these risks are the biggest consideration in the recommendation for delivery. Discussed induction is unfortunately a lengthy process for most women. Discussed she has never had a baby before and her body wasn't quite ready for this and so we have to try to start a process that wasn't already happening.   Discussed in terms of a cervical check, she can certainly always refuse this. Discussed without this exam, however, we cannot determine progress and figure out if we need to change our plan of action. Discussed that she has received 5 doses of cytotec today. Discussed I would give her a maximum of 6 doses. Discussed could check her cervix now and determine if she even needs that 6th dose or if we could switch to pitocin or give the 6th dose not knowing what changes might have already occurred to her cervix and potentially give her an unnecessary dose which would unnecessarily prolong this process. Discussed after the 6th dose, I will plan that we switch to pitocin, but that this could happen sooner if she is already dilated enough.  On my exam, I was able to reach the anterior lip of the cervix but not quite able to get around to get an adequate cervical exam. The cervix is certainly thin, the fetal head remains low, and I believe she is likely 1 cm. Discussed that this is a POSITIVE finding that we are moving in the right direction. Discussed it is important to remain optimistic through this process even though it can feel slow and frustrating. Encouraged her to keep moving as able.   She has stated that she only wants me to check her cervix from now on. I will  do my best to accommodate.    Danielle Albright MD  6/28/18 9565

## 2018-06-29 ENCOUNTER — ANESTHESIA EVENT (OUTPATIENT)
Dept: OBGYN | Facility: CLINIC | Age: 38
End: 2018-06-29
Payer: MEDICAID

## 2018-06-29 ENCOUNTER — ANESTHESIA (OUTPATIENT)
Dept: OBGYN | Facility: CLINIC | Age: 38
End: 2018-06-29
Payer: MEDICAID

## 2018-06-29 LAB
ABO + RH BLD: NORMAL
ABO + RH BLD: NORMAL
BLD GP AB SCN SERPL QL: NORMAL
BLOOD BANK CMNT PATIENT-IMP: NORMAL
ERYTHROCYTE [DISTWIDTH] IN BLOOD BY AUTOMATED COUNT: 13.8 % (ref 10–15)
HCT VFR BLD AUTO: 33.3 % (ref 35–47)
HGB BLD-MCNC: 11 G/DL (ref 11.7–15.7)
MCH RBC QN AUTO: 29.6 PG (ref 26.5–33)
MCHC RBC AUTO-ENTMCNC: 33 G/DL (ref 31.5–36.5)
MCV RBC AUTO: 90 FL (ref 78–100)
PLATELET # BLD AUTO: 187 10E9/L (ref 150–450)
RBC # BLD AUTO: 3.72 10E12/L (ref 3.8–5.2)
SPECIMEN EXP DATE BLD: NORMAL
WBC # BLD AUTO: 13.5 10E9/L (ref 4–11)

## 2018-06-29 PROCEDURE — 85027 COMPLETE CBC AUTOMATED: CPT | Performed by: OBSTETRICS & GYNECOLOGY

## 2018-06-29 PROCEDURE — 37000011 ZZH ANESTHESIA WARD SERVICE

## 2018-06-29 PROCEDURE — 25000132 ZZH RX MED GY IP 250 OP 250 PS 637: Performed by: OBSTETRICS & GYNECOLOGY

## 2018-06-29 PROCEDURE — 25000128 H RX IP 250 OP 636: Performed by: ANESTHESIOLOGY

## 2018-06-29 PROCEDURE — 12000029 ZZH R&B OB INTERMEDIATE

## 2018-06-29 PROCEDURE — 25000125 ZZHC RX 250: Performed by: ANESTHESIOLOGY

## 2018-06-29 PROCEDURE — 86900 BLOOD TYPING SEROLOGIC ABO: CPT | Performed by: OBSTETRICS & GYNECOLOGY

## 2018-06-29 PROCEDURE — 27110038 ZZH RX 271: Performed by: ANESTHESIOLOGY

## 2018-06-29 PROCEDURE — 86850 RBC ANTIBODY SCREEN: CPT | Performed by: OBSTETRICS & GYNECOLOGY

## 2018-06-29 PROCEDURE — 25000128 H RX IP 250 OP 636: Performed by: OBSTETRICS & GYNECOLOGY

## 2018-06-29 PROCEDURE — 3E0R3BZ INTRODUCTION OF ANESTHETIC AGENT INTO SPINAL CANAL, PERCUTANEOUS APPROACH: ICD-10-PCS | Performed by: ANESTHESIOLOGY

## 2018-06-29 PROCEDURE — 86901 BLOOD TYPING SEROLOGIC RH(D): CPT | Performed by: OBSTETRICS & GYNECOLOGY

## 2018-06-29 PROCEDURE — 36415 COLL VENOUS BLD VENIPUNCTURE: CPT | Performed by: OBSTETRICS & GYNECOLOGY

## 2018-06-29 PROCEDURE — 00HU33Z INSERTION OF INFUSION DEVICE INTO SPINAL CANAL, PERCUTANEOUS APPROACH: ICD-10-PCS | Performed by: ANESTHESIOLOGY

## 2018-06-29 RX ORDER — NALOXONE HYDROCHLORIDE 0.4 MG/ML
.1-.4 INJECTION, SOLUTION INTRAMUSCULAR; INTRAVENOUS; SUBCUTANEOUS
Status: DISCONTINUED | OUTPATIENT
Start: 2018-06-29 | End: 2018-06-30

## 2018-06-29 RX ORDER — LIDOCAINE HYDROCHLORIDE AND EPINEPHRINE 15; 5 MG/ML; UG/ML
INJECTION, SOLUTION EPIDURAL PRN
Status: DISCONTINUED | OUTPATIENT
Start: 2018-06-29 | End: 2018-06-30 | Stop reason: HOSPADM

## 2018-06-29 RX ORDER — FENTANYL CITRATE 50 UG/ML
50-100 INJECTION, SOLUTION INTRAMUSCULAR; INTRAVENOUS
Status: DISCONTINUED | OUTPATIENT
Start: 2018-06-29 | End: 2018-06-30

## 2018-06-29 RX ORDER — EPHEDRINE SULFATE 50 MG/ML
5 INJECTION, SOLUTION INTRAMUSCULAR; INTRAVENOUS; SUBCUTANEOUS
Status: DISCONTINUED | OUTPATIENT
Start: 2018-06-29 | End: 2018-06-30

## 2018-06-29 RX ORDER — NALBUPHINE HYDROCHLORIDE 10 MG/ML
2.5-5 INJECTION, SOLUTION INTRAMUSCULAR; INTRAVENOUS; SUBCUTANEOUS EVERY 6 HOURS PRN
Status: DISCONTINUED | OUTPATIENT
Start: 2018-06-29 | End: 2018-06-30

## 2018-06-29 RX ORDER — ROPIVACAINE HYDROCHLORIDE 2 MG/ML
10 INJECTION, SOLUTION EPIDURAL; INFILTRATION; PERINEURAL ONCE
Status: DISCONTINUED | OUTPATIENT
Start: 2018-06-29 | End: 2018-06-30

## 2018-06-29 RX ORDER — ROPIVACAINE HYDROCHLORIDE 2 MG/ML
10 INJECTION, SOLUTION EPIDURAL; INFILTRATION; PERINEURAL ONCE
Status: COMPLETED | OUTPATIENT
Start: 2018-06-29 | End: 2018-06-29

## 2018-06-29 RX ORDER — ROPIVACAINE HYDROCHLORIDE 2 MG/ML
INJECTION, SOLUTION EPIDURAL; INFILTRATION; PERINEURAL
Status: DISCONTINUED
Start: 2018-06-29 | End: 2018-06-30 | Stop reason: HOSPADM

## 2018-06-29 RX ADMIN — LIDOCAINE HYDROCHLORIDE AND EPINEPHRINE 3 ML: 15; 5 INJECTION, SOLUTION EPIDURAL at 09:40

## 2018-06-29 RX ADMIN — ACETAMINOPHEN 650 MG: 325 TABLET, FILM COATED ORAL at 19:47

## 2018-06-29 RX ADMIN — Medication 12 ML/HR: at 09:45

## 2018-06-29 RX ADMIN — SODIUM CHLORIDE, POTASSIUM CHLORIDE, SODIUM LACTATE AND CALCIUM CHLORIDE: 600; 310; 30; 20 INJECTION, SOLUTION INTRAVENOUS at 11:12

## 2018-06-29 RX ADMIN — SODIUM CHLORIDE, POTASSIUM CHLORIDE, SODIUM LACTATE AND CALCIUM CHLORIDE 250 ML: 600; 310; 30; 20 INJECTION, SOLUTION INTRAVENOUS at 20:26

## 2018-06-29 RX ADMIN — ROPIVACAINE HYDROCHLORIDE 8 ML: 2 INJECTION, SOLUTION EPIDURAL; INFILTRATION at 19:40

## 2018-06-29 RX ADMIN — SODIUM CHLORIDE, POTASSIUM CHLORIDE, SODIUM LACTATE AND CALCIUM CHLORIDE: 600; 310; 30; 20 INJECTION, SOLUTION INTRAVENOUS at 21:51

## 2018-06-29 RX ADMIN — SODIUM CHLORIDE, POTASSIUM CHLORIDE, SODIUM LACTATE AND CALCIUM CHLORIDE 1000 ML: 600; 310; 30; 20 INJECTION, SOLUTION INTRAVENOUS at 08:30

## 2018-06-29 RX ADMIN — SODIUM CHLORIDE, POTASSIUM CHLORIDE, SODIUM LACTATE AND CALCIUM CHLORIDE: 600; 310; 30; 20 INJECTION, SOLUTION INTRAVENOUS at 14:01

## 2018-06-29 RX ADMIN — SODIUM CHLORIDE, POTASSIUM CHLORIDE, SODIUM LACTATE AND CALCIUM CHLORIDE: 600; 310; 30; 20 INJECTION, SOLUTION INTRAVENOUS at 15:32

## 2018-06-29 RX ADMIN — ROPIVACAINE HYDROCHLORIDE 15 ML: 2 INJECTION, SOLUTION EPIDURAL; INFILTRATION at 09:40

## 2018-06-29 NOTE — PLAN OF CARE
Problem: Labor (Cervical Ripen, Induct, Augment) (Adult,Obstetrics,Pediatric)  Goal: Signs and Symptoms of Listed Potential Problems Will be Absent, Minimized or Managed (Labor)  Signs and symptoms of listed potential problems will be absent, minimized or managed by discharge/transition of care (reference Labor (Cervical Ripen, Induct, Augment) (Adult,Obstetrics,Pediatric) CPG).   Outcome: Improving  This AM patient agreed to doing epidural, unable to preform cervical checks before this. Dr. Fagan in for epidural placement, consent signed, time out done. Ropivcane handed off. Epidural placed and patient placed in left tilt. Pt tolerated well.  Dr. Albright in after patient was comfortable. SVE in done per Dr. Albright. At noon FHT started to be tachy, afebrile and mother's heart was <100. Pt was repositioned which decreased the heart rate slightly but then returned tachy. Dr. Albright at bedside and ordered CBC, also discussed with MD to start oxygen and give bolus. FHT returned to normal shortly after oxygen placed. Oxytocin continuously increased per protocol.  Category 1 tracing post oxygen placement. 1750 Dr. Albright at bedside. Found that patient was a lip.

## 2018-06-29 NOTE — ANESTHESIA PROCEDURE NOTES
Peripheral nerve/Neuraxial procedure note : epidural catheter  Pre-Procedure  Performed by JN YOUNG  Location: OB      Pre-Anesthestic Checklist: patient identified, IV checked, risks and benefits discussed, informed consent and monitors and equipment checked    Timeout  Correct Patient: Yes   Correct Procedure: Yes   Correct Site: Yes   Correct Laterality: N/A   Correct Position: Yes   Site Marked: N/A   .   Procedure Documentation    .    Procedure:    Epidural catheter.  Insertion Site:L3-4  (midline approach) Injection technique: LORT saline   Local skin infiltrated with 3 mL of 1% lidocaine.  MICHELE at 5 cm     Patient Prep;povidone-iodine 7.5% surgical scrub.  .  Needle: [employer] needle Needle Gauge: 17.    Needle Length (Inches) 3.5  # of attempts: 1 and # of redirects:  .   . .  Catheter threaded easily  5 cm epidural space.  10 cm at skin.   .    Assessment/Narrative  Paresthesias: No.  .  .  Aspiration negative for heme or CSF  . Test dose of 3 mL lidocaine 1.5% w/ 1:200,000 epinephrine at. Test dose negative for signs of intravascular, subdural or intrathecal injection. Comments:  Labor Epidural  No complications.

## 2018-06-29 NOTE — PROGRESS NOTES
"[facility]  Obstetrics    Waqar Hogue  [id number]  1980    S: Feeling comfortable, more numb in the legs now    O: /61  Pulse 83  Temp 98.4  F (36.9  C) (Oral)  Resp 18  Ht 1.727 m (5' 8\")  Wt 79.8 kg (176 lb)  SpO2 94%  BMI 26.76 kg/m2  Gen: NAD, resting  Abd: soft, NT, ND, gravid  SVE: /0    FHT: 160s/mod/+accels/no decels  [place]: ctx q3 mins    A/P: 36 yo  @ 37+2 admitted for IOL due to oligohydramnios  - Fetal tachycardia - baseline has risen to 160s. Maternal pulse low 100s and she is afebrile. Will obtain CBC. Discussed with the patient that the continued moderate variability, accelerations, and lack of decelerations are reassuring findings.   - Labor is now progressing with 2 cm progress in the last 3 hours  - GBS neg  - Anticipate     Danielle Albright MD  18 1350    Update:   WBC 13.5 - not significantly elevated to suggest infection. FHT did have a period of tachycardia to 180s with occasional minimal variability but now again has baseline 160s with moderate variability and accels without decels. No concerns for fetal status at this time.   "

## 2018-06-29 NOTE — PROGRESS NOTES
[facility]  Obstetrics    Waqar Hogue  1980  [id number]    Late entry due to pt care    Checked on pt at approximately 2145. She was feeling some discomfort with ctx at that time. Still ambulating and going through relaxation techniques with .  Established a plan for the night. Discussed now that pitocin has been started, the plan is to continue that until delivery. Discussed pitocin dosing is titrated based on contraction pattern and that cervical exams will not impact what we do with the pitocin. Discussed that given her significant discomfort with cervical checks, I certainly want to ensure that each check has value and contributes to how we proceed. Discussed without any concerning FHR findings or complaints from pt regarding significantly increased pain and pressure, I am happy to defer checks overnight and plan to next check in the morning. Discussed the only potential change that may occur with a check is if she were more dilated and cervix was more easily accessible, I could AROM, which may stimulate further progress. Discussed again that IOL can be a lengthy process and I allow ample time for this in order to ensure the best outcomes for moms and babies. Discussed given the induction process truly didn't start until 0700 6/28/18, we have a lot of time before even by clinical definitions she would be considered to be a failed induction, and I don't think we are going to come to that. Discussed I think we are moving in a positive direction based on her ctx pattern and subjective reports of discomfort. She was encouraged to continue ambulating and moving as able but also to get some rest so that when it's time for pushing, she is ready and full of energy. She expressed appreciation of this conversation. She is aware that I plan to remain in house overnight and that I can come evaluate at any time she thinks her sensations have changed and desires a check.   We also discussed  timing of epidural. Her biggest fear is the pain of pushing and delivering and as she currently has no idea how that will feel she wants to make sure she has an epidural for it. We discussed that epidurals have varying effects for every woman and I can't guarantee how much she will or will not feel. Discussed she can get an epidural any time during labor that she feels the pain is too much to bear. Discussed can get it at any point essentially before full dilation as even if 9 cm in a prime she will likely experience effect before delivery. She will continue to monitor her symptoms to assess the timing that is best for her.    Danielle Albright MD  6/29/18 0005

## 2018-06-29 NOTE — PLAN OF CARE
1920 Report received from DAVID Funez  2003 Talked with Dr. Albright about plan of care overnight.  Plan to start Pitocin as appropriate.    2025 Pitocin initiated at 2 mUnits/min.  Vital signs within defined limits.   bpm with moderate variability.  2103 Pitocin increased to 4 mUnits/min.  2140 Dr. Albright at bedside, explaining plan for night.   2203 Pitocin increased to 6 mUnits/min.  0010 Dr. Albright in department.  Agreed to allow patient to have nitrous oxide for pain relief.  Updated on patient labor status.  0025 Nitrous Oxide initiated.  Consent signed.   0042 Pitocin halved to 3 mUnits/min due to frequency of contractions.  0311 Pitocin increased to 4 mUnits/min.  0348 Pitocin increased to 6 mUnits/min.  0449 Pitocin increased to 7 mUnits/min.

## 2018-06-29 NOTE — PROGRESS NOTES
"[facility]  Obstetrics    Waqar Hogue  1980  [id number]    38 yo  @ 37+2 admitted for IOL due to oligohydramnios. Cervical exams were deferred overnight. She requests check this morning. Cervical exam was attempted but pt was unable to relax sufficiently. I recommended that she have an epidural placed so she can relax and pain is not an issue with checks. Discussed I can't assess the progress of labor without an exam, unfortunately. Discussed that it is understandable why her exams are uncomfortable as the fetal head is low and already causing a lot of pressure. Discussed that if she is unable to relax for an exam, I worry about how she will be able to relax her pelvic appropriately and push when it comes time. Discussed that since she has been in bed sleeping for the night, she can get up and walk for a while and stretch her legs before getting the epidural. Her , Abbey, offered up suggestions of ways she can keep moving in bed so she does not feel so \"stuck\". Waqar is open to this plan as she also worries about how she will handle pushing. She is afraid of getting the epidural too early, and we discussed that this is unfortunately something we won't be able to know as I can't do an exam at all in current state. She is understanding and will walk before epidural request.    Danielle Albright MD  18 0735  "

## 2018-06-29 NOTE — PROGRESS NOTES
"[facility]  Obstetrics    Waqar Hogue  1980  [id number]    S: Comfortable with epidural. Still able to move legs well. Rested a bit.     O: /79  Pulse 83  Temp 98.2  F (36.8  C) (Oral)  Resp 16  Ht 1.727 m (5' 8\")  Wt 79.8 kg (176 lb)  BMI 26.76 kg/m2  Gen: NAD  Abd: soft, NT, ND, gravid  SVE: 2/75/0 much more anterior than previously, AROM clear fluid    FHT: 150s/mod/+accels/no decels  [place]: ctx q2-3 mins    A/P: 36 yo  @ 37+2 admitted for IOL due to oligohydramnios  - Cat I FHT  - GBS neg  - Induction progress has been slow and obstructed by inability to perform cervical checks. Check just now was first in about 16 hours. Cervix started closed and is now 2 cm and reassuringly much more anterior than previously. I discussed that I believe relaxation with the epidural and AROM will help progress continue at this point. Discussed it is very reassuring that FHT has been reactive and Cat I throughout this whole process. Discussed certainly guidelines and labor curves exist but that they are not strict rules. Discussed I cannot give a good prediction of delivery timing at this point.    Danielle Albright MD  18  "

## 2018-06-29 NOTE — PROGRESS NOTES
"[facility]  Obstetrics    Waqar Hogue  1980  [id number]    S: Doing well. Has a little bit more feeling back in her legs. Starting to feel some pressure.    O: /65  Pulse 83  Temp 99.3  F (37.4  C) (Oral)  Resp 18  Ht 1.727 m (5' 8\")  Wt 79.8 kg (176 lb)  SpO2 93%  BMI 26.76 kg/m2  Gen: NAD  Abd: soft, NT, ND, gravid  SVE: anterior lip/100/0    FHT: 150s/mod/no accels or decels  [place]: ctx not always tracing consistently but ~q2-4 mins    A/P: 36 yo  @ 37+2 admitted for IOL due to oligohydramnios  - Significant labor progress. Discussed that the small amount of remaining cervix needs to go away and then we will let the baby's head come down a bit before we start pushing. Discussed for some women there comes a time when they have a sensation to push that they're unable to ignore and if this happens while laboring down she can certainly start pushing.   - FHT Cat I  - GBS neg  - Anticipate     Danielle Albright MD 18 026  "

## 2018-06-30 PROCEDURE — 88307 TISSUE EXAM BY PATHOLOGIST: CPT | Performed by: OBSTETRICS & GYNECOLOGY

## 2018-06-30 PROCEDURE — 0KQM0ZZ REPAIR PERINEUM MUSCLE, OPEN APPROACH: ICD-10-PCS | Performed by: OBSTETRICS & GYNECOLOGY

## 2018-06-30 PROCEDURE — 72200001 ZZH LABOR CARE VAGINAL DELIVERY SINGLE

## 2018-06-30 PROCEDURE — 12000037 ZZH R&B POSTPARTUM INTERMEDIATE

## 2018-06-30 PROCEDURE — 88307 TISSUE EXAM BY PATHOLOGIST: CPT | Mod: 26 | Performed by: OBSTETRICS & GYNECOLOGY

## 2018-06-30 PROCEDURE — 25000125 ZZHC RX 250: Performed by: OBSTETRICS & GYNECOLOGY

## 2018-06-30 PROCEDURE — 25000132 ZZH RX MED GY IP 250 OP 250 PS 637: Performed by: OBSTETRICS & GYNECOLOGY

## 2018-06-30 PROCEDURE — 10907ZC DRAINAGE OF AMNIOTIC FLUID, THERAPEUTIC FROM PRODUCTS OF CONCEPTION, VIA NATURAL OR ARTIFICIAL OPENING: ICD-10-PCS | Performed by: OBSTETRICS & GYNECOLOGY

## 2018-06-30 RX ORDER — AMOXICILLIN 250 MG
1 CAPSULE ORAL 2 TIMES DAILY
Status: DISCONTINUED | OUTPATIENT
Start: 2018-06-30 | End: 2018-07-02 | Stop reason: HOSPADM

## 2018-06-30 RX ORDER — ACETAMINOPHEN 325 MG/1
650 TABLET ORAL EVERY 4 HOURS PRN
Status: DISCONTINUED | OUTPATIENT
Start: 2018-06-30 | End: 2018-07-02 | Stop reason: HOSPADM

## 2018-06-30 RX ORDER — OXYTOCIN/0.9 % SODIUM CHLORIDE 30/500 ML
100 PLASTIC BAG, INJECTION (ML) INTRAVENOUS CONTINUOUS
Status: DISCONTINUED | OUTPATIENT
Start: 2018-06-30 | End: 2018-07-02 | Stop reason: HOSPADM

## 2018-06-30 RX ORDER — OXYTOCIN 10 [USP'U]/ML
10 INJECTION, SOLUTION INTRAMUSCULAR; INTRAVENOUS
Status: DISCONTINUED | OUTPATIENT
Start: 2018-06-30 | End: 2018-07-02 | Stop reason: HOSPADM

## 2018-06-30 RX ORDER — IBUPROFEN 400 MG/1
800 TABLET, FILM COATED ORAL EVERY 6 HOURS PRN
Status: DISCONTINUED | OUTPATIENT
Start: 2018-06-30 | End: 2018-07-02 | Stop reason: HOSPADM

## 2018-06-30 RX ORDER — OXYTOCIN/0.9 % SODIUM CHLORIDE 30/500 ML
340 PLASTIC BAG, INJECTION (ML) INTRAVENOUS CONTINUOUS PRN
Status: DISCONTINUED | OUTPATIENT
Start: 2018-06-30 | End: 2018-07-02 | Stop reason: HOSPADM

## 2018-06-30 RX ORDER — HYDROCORTISONE 2.5 %
CREAM (GRAM) TOPICAL 3 TIMES DAILY PRN
Status: DISCONTINUED | OUTPATIENT
Start: 2018-06-30 | End: 2018-07-02 | Stop reason: HOSPADM

## 2018-06-30 RX ORDER — LANOLIN 100 %
OINTMENT (GRAM) TOPICAL
Status: DISCONTINUED | OUTPATIENT
Start: 2018-06-30 | End: 2018-07-02 | Stop reason: HOSPADM

## 2018-06-30 RX ORDER — OXYCODONE HYDROCHLORIDE 5 MG/1
5 TABLET ORAL EVERY 4 HOURS PRN
Status: DISCONTINUED | OUTPATIENT
Start: 2018-06-30 | End: 2018-07-02 | Stop reason: HOSPADM

## 2018-06-30 RX ORDER — BISACODYL 10 MG
10 SUPPOSITORY, RECTAL RECTAL DAILY PRN
Status: DISCONTINUED | OUTPATIENT
Start: 2018-07-02 | End: 2018-07-02 | Stop reason: HOSPADM

## 2018-06-30 RX ORDER — AMOXICILLIN 250 MG
2 CAPSULE ORAL 2 TIMES DAILY
Status: DISCONTINUED | OUTPATIENT
Start: 2018-06-30 | End: 2018-07-02 | Stop reason: HOSPADM

## 2018-06-30 RX ORDER — NALOXONE HYDROCHLORIDE 0.4 MG/ML
.1-.4 INJECTION, SOLUTION INTRAMUSCULAR; INTRAVENOUS; SUBCUTANEOUS
Status: DISCONTINUED | OUTPATIENT
Start: 2018-06-30 | End: 2018-07-02 | Stop reason: HOSPADM

## 2018-06-30 RX ORDER — MISOPROSTOL 200 UG/1
400 TABLET ORAL
Status: DISCONTINUED | OUTPATIENT
Start: 2018-06-30 | End: 2018-07-02 | Stop reason: HOSPADM

## 2018-06-30 RX ADMIN — IBUPROFEN 800 MG: 400 TABLET ORAL at 01:20

## 2018-06-30 RX ADMIN — ACETAMINOPHEN 650 MG: 325 TABLET, FILM COATED ORAL at 15:10

## 2018-06-30 RX ADMIN — IBUPROFEN 800 MG: 400 TABLET ORAL at 15:10

## 2018-06-30 RX ADMIN — ACETAMINOPHEN 650 MG: 325 TABLET, FILM COATED ORAL at 09:30

## 2018-06-30 RX ADMIN — OXYCODONE HYDROCHLORIDE 5 MG: 5 TABLET ORAL at 06:31

## 2018-06-30 RX ADMIN — SENNOSIDES AND DOCUSATE SODIUM 1 TABLET: 8.6; 5 TABLET ORAL at 07:34

## 2018-06-30 RX ADMIN — OXYCODONE HYDROCHLORIDE 5 MG: 5 TABLET ORAL at 16:01

## 2018-06-30 RX ADMIN — ACETAMINOPHEN 650 MG: 325 TABLET, FILM COATED ORAL at 01:20

## 2018-06-30 RX ADMIN — OXYCODONE HYDROCHLORIDE 5 MG: 5 TABLET ORAL at 11:05

## 2018-06-30 RX ADMIN — OXYCODONE HYDROCHLORIDE 5 MG: 5 TABLET ORAL at 19:55

## 2018-06-30 RX ADMIN — SENNOSIDES AND DOCUSATE SODIUM 1 TABLET: 8.6; 5 TABLET ORAL at 21:27

## 2018-06-30 RX ADMIN — OXYTOCIN-SODIUM CHLORIDE 0.9% IV SOLN 30 UNIT/500ML 340 ML/HR: 30-0.9/5 SOLUTION at 00:33

## 2018-06-30 RX ADMIN — IBUPROFEN 800 MG: 400 TABLET ORAL at 21:27

## 2018-06-30 RX ADMIN — OXYCODONE HYDROCHLORIDE 5 MG: 5 TABLET ORAL at 02:21

## 2018-06-30 RX ADMIN — ACETAMINOPHEN 650 MG: 325 TABLET, FILM COATED ORAL at 21:27

## 2018-06-30 RX ADMIN — ACETAMINOPHEN 650 MG: 325 TABLET, FILM COATED ORAL at 05:28

## 2018-06-30 RX ADMIN — IBUPROFEN 800 MG: 400 TABLET ORAL at 07:34

## 2018-06-30 NOTE — PLAN OF CARE
Patient, spouse, and infant transferred to room 426 accompanied by Lisa DUDLEY RN. Bedside report received. ID bands double verified. Patient belongings transferred alongside as well. Patient and spouse oriented to room, call lights, and plan of care for the night. Reviewed safety protocols including ID band checks and bulb syringe use. Questions and concerns addressed at this time. Will continue to monitor.

## 2018-06-30 NOTE — PLAN OF CARE
Data: Waqar Hogue transferred to 426 via wheelchair at 0310. Baby transferred via parent's arms.  Action: Receiving unit notified of transfer: Yes. Patient and family notified of room change. Report given to Tammie ELLIOTT RN at 0310. Belongings sent to receiving unit. Accompanied by Registered Nurse. Oriented patient to surroundings. Call light within reach. ID bands double-checked with receiving RN.  Response: Patient tolerated transfer and is stable.

## 2018-06-30 NOTE — PLAN OF CARE
Problem: Patient Care Overview  Goal: Plan of Care/Patient Progress Review  Outcome: No Change  Pt VSS, firm fundus at U-1 w/scant rubra flow, Ice packs to devin-neum, pain managed w/Tylenol, Ibuprofen & Oxycodone, spouse at bedside & supportive, up in room, voiding, showered this shift, continue to monitor.

## 2018-06-30 NOTE — LACTATION NOTE
Initial visit. Mother very sleepy at time of visit and asked LC to come back at a later time.  Told to call out so floor RN could let me know and will come back for a feeding.   Erin Alexander RN, IBCLC     1040 - Called back to room for a feeding.  Helped Mother position herself in rocking chair with pillows to support her and baby.  Removed blanket and shirt from baby and placed in cross cradle position on right breast.  Infant needing assistance to latch on but able to achieve latch.  Encouraged Mother to wait for baby to open mouth wide before attempting latch.  Good latch achieved.  Mother shown ways to help stimulate baby during feeding.  Encouraged to continue feeding for 10-20 minutes and encouraged to feed baby at least 8-12 times in a 24 hour period.  Reviewed general breast feeding information.  Erin Alexander RN, IBCLC

## 2018-06-30 NOTE — ANESTHESIA POSTPROCEDURE EVALUATION
Patient: Waqar Hogue    * No procedures listed *    Diagnosis:* No pre-op diagnosis entered *  Diagnosis Additional Information: No value filed.    Anesthesia Type:  No value filed.    Note:  Anesthesia Post Evaluation    Patient location during evaluation: floor  Patient participation: Able to fully participate in evaluation  Level of consciousness: awake and alert  Pain management: adequate  Airway patency: patent  Cardiovascular status: acceptable  Respiratory status: acceptable     Anesthetic complications: None    Comments: Epidural has worn off and no complications        Last vitals:  Vitals:    06/30/18 0230 06/30/18 0333 06/30/18 1105   BP: 110/65 102/62 123/53   Pulse:      Resp: 18 18 18   Temp:  36.8  C (98.2  F) 36.4  C (97.6  F)   SpO2: 96%           Electronically Signed By: Tony Gooden MD  June 30, 2018  11:38 AM

## 2018-06-30 NOTE — PROGRESS NOTES
Called to assess patient for increased pain with contraction    Epidural bolused with ropivicaine 0.2 % 8 ccc    Meghana Richards MD  [phone]

## 2018-06-30 NOTE — PLAN OF CARE
1910 Assumed patient care. Patient complaining of lower uterine pain and hip pain. MDA called to assess  1945 MDA at bedside. Ropivacaine handed off to Dr. Richards, bolus given per SOPHIA.  2130 Pt complete per MD. Set up for pushing and began pushing with MD at bedside. Aware of FHT's.   0026 Delivery of female infant. Placed skin to skin with mother when stable and bonding well.

## 2018-06-30 NOTE — PROCEDURES
OB Vaginal Delivery Summary    Waqar Hogue   Admission date: 2018  Delivery date:  18  Place of delivery: [facility]    The patient is a 37 year old  at 37w3d  wks gestation admitted to labor and delivery for IOL due to oligohydramnios.  This is an IVF pregnancy. It has been uncomplicated.  The estimate fetal weight is 7#. GBS was neg.    Cytotec was given for cervical ripening. AROM was performed to clear fluid and pitocin was used for induction. For pain management she had epidural with good relief. During labor the fetal heart tones were primarily Category I.    She progressed to complete dilation at 2130.  She had excellent maternal expulsive efforts. FHT were Cat II throughout a majority of the second stage given fetal tachycardia 160s-170s but were Cat I with normal baseline just prior to delivery. She pushed for 2 hrs 56 mins. At 0026 she delivered a viable female infant weighing 8 pounds 0 ounces with apgars of 7 at 1 min and 8 at 5 minutes. The infant was initially placed on the maternal abdomen but due to poor tone the cord was clamped and cut and the infant was brought to the warmer.      The placenta delivered spontaneously. The anterior portion felt more adherent. The placenta was inspected and appeared to be intact. It was sent to pathology.  She received pitocin upon placental delivery.  The estimated blood loss was 186 mL.    The cervix and vagina were inspected.  There was a second degree perineal laceration which was repaired with 3-0 vicryl assuring hemostasis and close approximation.     Mother and baby did well and went to normal postpartum and  nursery.      Danielle Albright MD

## 2018-07-01 PROCEDURE — 90715 TDAP VACCINE 7 YRS/> IM: CPT | Performed by: OBSTETRICS & GYNECOLOGY

## 2018-07-01 PROCEDURE — 12000037 ZZH R&B POSTPARTUM INTERMEDIATE

## 2018-07-01 PROCEDURE — 25000128 H RX IP 250 OP 636: Performed by: OBSTETRICS & GYNECOLOGY

## 2018-07-01 PROCEDURE — 25000132 ZZH RX MED GY IP 250 OP 250 PS 637: Performed by: OBSTETRICS & GYNECOLOGY

## 2018-07-01 RX ADMIN — IBUPROFEN 800 MG: 400 TABLET ORAL at 17:15

## 2018-07-01 RX ADMIN — CLOSTRIDIUM TETANI TOXOID ANTIGEN (FORMALDEHYDE INACTIVATED), CORYNEBACTERIUM DIPHTHERIAE TOXOID ANTIGEN (FORMALDEHYDE INACTIVATED), BORDETELLA PERTUSSIS TOXOID ANTIGEN (GLUTARALDEHYDE INACTIVATED), BORDETELLA PERTUSSIS FILAMENTOUS HEMAGGLUTININ ANTIGEN (FORMALDEHYDE INACTIVATED), BORDETELLA PERTUSSIS PERTACTIN ANTIGEN, AND BORDETELLA PERTUSSIS FIMBRIAE 2/3 ANTIGEN 0.5 ML: 5; 2; 2.5; 5; 3; 5 INJECTION, SUSPENSION INTRAMUSCULAR at 07:42

## 2018-07-01 RX ADMIN — OXYCODONE HYDROCHLORIDE 5 MG: 5 TABLET ORAL at 05:23

## 2018-07-01 RX ADMIN — OXYCODONE HYDROCHLORIDE 5 MG: 5 TABLET ORAL at 01:25

## 2018-07-01 RX ADMIN — ACETAMINOPHEN 650 MG: 325 TABLET, FILM COATED ORAL at 05:23

## 2018-07-01 RX ADMIN — ACETAMINOPHEN 650 MG: 325 TABLET, FILM COATED ORAL at 23:36

## 2018-07-01 RX ADMIN — ACETAMINOPHEN 650 MG: 325 TABLET, FILM COATED ORAL at 11:24

## 2018-07-01 RX ADMIN — IBUPROFEN 800 MG: 400 TABLET ORAL at 23:36

## 2018-07-01 RX ADMIN — IBUPROFEN 800 MG: 400 TABLET ORAL at 05:23

## 2018-07-01 RX ADMIN — SENNOSIDES AND DOCUSATE SODIUM 1 TABLET: 8.6; 5 TABLET ORAL at 07:33

## 2018-07-01 RX ADMIN — IBUPROFEN 800 MG: 400 TABLET ORAL at 11:25

## 2018-07-01 RX ADMIN — SENNOSIDES AND DOCUSATE SODIUM 1 TABLET: 8.6; 5 TABLET ORAL at 22:21

## 2018-07-01 RX ADMIN — ACETAMINOPHEN 650 MG: 325 TABLET, FILM COATED ORAL at 17:15

## 2018-07-01 NOTE — PROGRESS NOTES
[facility] Obstetrics Post-Partum Progress Note          Assessment and Plan:    Assessment:   Post-partum day #1  Normal spontaneous vaginal delivery  L&D complications: None      Doing well. Rectal area sore so on oxycodone which is helping.      Plan:   Discharge home tomorrow.           Interval History:   Doing well.  Pain is well-controlled.  No fevers.  No history of foul-smelling vaginal discharge.  Good appetite.  Denies chest pain, shortness of breath, nausea or vomiting.  Vaginal bleeding is similar to a heavy menstrual flow.  Ambulatory.  Breastfeeding well.          Significant Problems:    None          Review of Systems:    The patient denies any chest pain, shortness of breath, excessive pain, fever, chills, purulent drainage from the wound, nausea or vomiting.          Medications:     Current Facility-Administered Medications   Medication     acetaminophen (TYLENOL) tablet 650 mg     [START ON 7/2/2018] bisacodyl (DULCOLAX) Suppository 10 mg     hydrocortisone 2.5 % cream     ibuprofen (ADVIL/MOTRIN) tablet 800 mg     lactated ringers BOLUS 1,000 mL     lanolin ointment     misoprostol (CYTOTEC) tablet 400 mcg     naloxone (NARCAN) injection 0.1-0.4 mg     No MMR Needed - Assessment: Patient does not need MMR vaccine     NO Rho (D) immune globulin (RhoGam) needed - mother Rh POSITIVE     oxyCODONE IR (ROXICODONE) tablet 5 mg     oxytocin (PITOCIN) 30 units in 500 mL 0.9% NaCl infusion     oxytocin (PITOCIN) 30 units in 500 mL 0.9% NaCl infusion     oxytocin (PITOCIN) injection 10 Units     senna-docusate (SENOKOT-S;PERICOLACE) 8.6-50 MG per tablet 1 tablet    Or     senna-docusate (SENOKOT-S;PERICOLACE) 8.6-50 MG per tablet 2 tablet     [START ON 7/2/2018] sodium phosphate (FLEET ENEMA) 1 enema             Physical Exam:   All vitals stable  Uterine fundus is firm, non-tender and at the level of the umbilicus  Episiotomy sutures intact and wound healing well          Data:     Hemoglobin    Date Value Ref Range Status   06/29/2018 11.0 (L) 11.7 - 15.7 g/dL Final   12/20/2017 12.7 11.7 - 15.7 g/dL Final     No imaging studies have been ordered    Cristy Elias MD

## 2018-07-01 NOTE — PLAN OF CARE
Problem: Patient Care Overview  Goal: Plan of Care/Patient Progress Review  Outcome: Improving  Pt's pain controlled with Oxycodone/Ibuprofen/Tylenol. Up and about in room. Ice packs to perineum.

## 2018-07-01 NOTE — PLAN OF CARE
Problem: Postpartum (Vaginal Delivery) (Adult,Obstetrics,Pediatric)  Goal: Signs and Symptoms of Listed Potential Problems Will be Absent, Minimized or Managed (Postpartum)  Signs and symptoms of listed potential problems will be absent, minimized or managed by discharge/transition of care (reference Postpartum (Vaginal Delivery) (Adult,Obstetrics,Pediatric) CPG).   Outcome: No Change  VSS. Rubra scant. Voiding. C/O rectal pain/pressure, managing to oxycodone, tylenol, ibuprofen, ice and tucks with some relief. Pt working on infant cares, encouraged skin to skin, supported/educated breastfeeding every 2-3 hours. Reminded parents that infant needs to be in bassinet when they are sleeping, parents were planning to co-sleep with infant insert prior to education.

## 2018-07-01 NOTE — PLAN OF CARE
Problem: Patient Care Overview  Goal: Plan of Care/Patient Progress Review  Outcome: Improving  VSS. Fundus firm and midline. Scant flow. Pain 7/10, taking tylenol, ibuprofen, and oxycodone. Ambulating free of dizziness or lightheaded. Voiding without difficulty. Encouraged to call with needs, questions, or concerns. Will continue to monitor.

## 2018-07-02 VITALS
WEIGHT: 176 LBS | RESPIRATION RATE: 16 BRPM | BODY MASS INDEX: 26.67 KG/M2 | TEMPERATURE: 97.6 F | SYSTOLIC BLOOD PRESSURE: 111 MMHG | DIASTOLIC BLOOD PRESSURE: 75 MMHG | OXYGEN SATURATION: 96 % | HEART RATE: 89 BPM | HEIGHT: 68 IN

## 2018-07-02 PROCEDURE — 25000132 ZZH RX MED GY IP 250 OP 250 PS 637: Performed by: OBSTETRICS & GYNECOLOGY

## 2018-07-02 RX ORDER — ACETAMINOPHEN 325 MG/1
650 TABLET ORAL EVERY 4 HOURS PRN
Qty: 100 TABLET | Refills: 0 | Status: SHIPPED | OUTPATIENT
Start: 2018-07-02

## 2018-07-02 RX ORDER — IBUPROFEN 800 MG/1
800 TABLET, FILM COATED ORAL EVERY 6 HOURS PRN
Qty: 60 TABLET | Refills: 0 | Status: ON HOLD | OUTPATIENT
Start: 2018-07-02 | End: 2020-09-26

## 2018-07-02 RX ORDER — OXYCODONE HYDROCHLORIDE 5 MG/1
5 TABLET ORAL EVERY 4 HOURS PRN
Qty: 10 TABLET | Refills: 0 | Status: ON HOLD | OUTPATIENT
Start: 2018-07-02 | End: 2020-09-26

## 2018-07-02 RX ADMIN — SENNOSIDES AND DOCUSATE SODIUM 1 TABLET: 8.6; 5 TABLET ORAL at 08:09

## 2018-07-02 RX ADMIN — IBUPROFEN 800 MG: 400 TABLET ORAL at 08:09

## 2018-07-02 RX ADMIN — ACETAMINOPHEN 650 MG: 325 TABLET, FILM COATED ORAL at 05:14

## 2018-07-02 RX ADMIN — ACETAMINOPHEN 650 MG: 325 TABLET, FILM COATED ORAL at 10:05

## 2018-07-02 NOTE — PLAN OF CARE
Problem: Patient Care Overview  Goal: Plan of Care/Patient Progress Review  Outcome: No Change  VSS. Fundus firm and midline. Scant flow. Pain 6/10, taking tylenol and ibuprofen. Breastfeeding.. Voiding without difficulty. Encouraged to call with needs, questions, or concerns. Will continue to monitor. Patient is emotional becaue the baby has a high bilirubin and is on  Bili bed and blanket. Questions were answered.

## 2018-07-02 NOTE — LACTATION NOTE
Follow up visit.  Infant has been breast feeding.  On phototherapy.  Started supplement due to elevated bilirubin and 11% weight loss.  Eliazar was very tearful and exhausted this morning.  Her nipples are sore.  Plan is to breast feed 15 minutes each side then supplement with 30 ml of ebm or formula.  Encouraged Waqar to pump after breast feeding if able.  She was concerned about the soreness.  Explained benefit of doing both but told her if it's too much she can choose to either breast feed or pump.    Waqar was feeding at time of visit with excellent latch.  Infant was feeding well.  Started using shells for sore nipples and she felt they were more comfortable.  Waqar was anxious about having given formula, she said her  told her she should have chosen donor milk but she felt weird using someone else's milk.  Reassured her that supplementing with formula was fine and she clearly felt better.  Explained importance of supplement and that it would only be temporary until her milk comes in.    Waqar had no other questions.  Shirin Smart  RN, IBCLC

## 2018-07-02 NOTE — PLAN OF CARE
Problem: Patient Care Overview  Goal: Plan of Care/Patient Progress Review  Outcome: Improving  VSS. Fundus firm and midline. Scant flow. Pain 6/10, taking tylenol and ibuprofen. Breastfeeding. Ambulating free of dizziness or lightheaded. Voiding without difficulty. Encouraged to call with needs, questions, or concerns. Will continue to monitor.

## 2018-07-02 NOTE — PLAN OF CARE
Problem: Patient Care Overview  Goal: Plan of Care/Patient Progress Review  Outcome: Adequate for Discharge Date Met: 18  Pt's pain controlled with Ibuprofen/Tylenol. Up and about in room and hallways. Discharged but will be rooming in with  due to jaundice.

## 2018-07-02 NOTE — DISCHARGE SUMMARY
[facility]    Discharge Summary  Obstetrics    Date of Admission:  2018  Date of Discharge:  2018  Discharging Provider: Danielle Albright    Discharge Diagnoses   IVF pregnancy  Oligohydramnios  S/p     History of Present Illness   Waqar Hogue is a 37 year old  who presented @ 37+3 for IOL due to oligohydramnios. This is an IVF pregnancy. She had Level II and fetal echo with MFM - UTD1 renal pyelectasis noted but later resolved. Pregnancy otherwise uncomplicated. She had serial growth scans and BPPs due to IVF status. At 37 weeks, oligohydramnios was noted with SARIKA 2.5. BPP otherwise 8/10. IOL was recommended. She received cytotec for cervical ripening. Pitocin was started after 6 doses of cytotec. She was very intolerant of cervical checks and ultimately required epidural before a thorough exam could be performed. At that time, AROM was performed. She ultimately progressed to full cervical dilation. FHT were intermittently tachycardic 160s-170s but otherwise Category I throughout labor. She delivered a viable female infant weighing 7#15.7oz with Apgars of 7/8.     Hospital Course   The patient's hospital course was unremarkable.  She recovered as anticipated and experienced no post-delivery complications. On discharge, her pain was well controlled. Vaginal bleeding appropriate.  Voiding without difficulty.  Ambulating well and tolerating a normal diet.  No fevers.  Breastfeeding well.  Infant required bili lights starting PPD1. This made the pt very emotional.  She was discharged on post-partum day 2.    She was discharged to home with routine postpartum pain prescriptions for ibuprofen and acetaminophen as well as a small Rx for oxycodone 5 mg #10 given perineal/rectal pain.     She will f/u in the office in 1 week for mood check and in 6 weeks for routine postpartum appointment.       Post-partum hemoglobin:   Hemoglobin   Date Value Ref Range Status   2018 11.0 (L)  11.7 - 15.7 g/dL Final       Danielle Albright MD       Discharge Disposition   Discharged to home   Condition at discharge: Stable    Primary Care Physician   [facility]    Consultations This Hospital Stay   ANESTHESIOLOGY IP CONSULT  ANESTHESIOLOGY IP CONSULT  HOME CARE POST PARTUM/ IP CONSULT  LACTATION IP CONSULT    Discharge Orders     Activity   Review discharge instructions     Reason for your hospital stay   Maternity care     Discharge Instructions - Postpartum visit   Schedule postpartum visit at [facility] in 6 weeks     Diet   Resume previous diet       Discharge Medications   Current Discharge Medication List      START taking these medications    Details   acetaminophen (TYLENOL) 325 MG tablet Take 2 tablets (650 mg) by mouth every 4 hours as needed for mild pain (greater than or equal to 38  C /100.4  F (oral) or 38.5  C/ 101.4  F (core).)  Qty: 100 tablet, Refills: 0    Associated Diagnoses: Normal spontaneous vaginal delivery      ibuprofen (ADVIL/MOTRIN) 800 MG tablet Take 1 tablet (800 mg) by mouth every 6 hours as needed for other (cramping)  Qty: 60 tablet, Refills: 0    Associated Diagnoses: Normal spontaneous vaginal delivery      oxyCODONE IR (ROXICODONE) 5 MG tablet Take 1 tablet (5 mg) by mouth every 4 hours as needed for moderate to severe pain  Qty: 10 tablet, Refills: 0    Associated Diagnoses: Normal spontaneous vaginal delivery         CONTINUE these medications which have NOT CHANGED    Details   clindamycin (CLEOCIN T) 1 % lotion APPLY TO AFFECTED AREAS OF ACNE D  Refills: 10      clindamycin (CLEOCIN-T) 1 % solution Apply topically 2 times daily  Qty: 60 mL, Refills: 3    Associated Diagnoses: Acne vulgaris      Prenatal Vit-Fe Fumarate-FA (PRENATAL MULTIVITAMIN PLUS IRON) 27-0.8 MG TABS per tablet Take 1 tablet by mouth daily         STOP taking these medications       FOLIC ACID PO Comments:   Reason for Stopping:             Allergies   No  Known Allergies    Danielle Albright MD

## 2018-07-02 NOTE — DISCHARGE INSTRUCTIONS
Postpartum Vaginal Delivery Instructions    Activity       Ask family and friends for help when you need it.    Do not place anything in your vagina for 6 weeks.    You are not restricted on other activities, but take it easy for a few weeks to allow your body to recover from delivery.  You are able to do any activities you feel up to that point.    No driving until you have stopped taking your pain medications (usually two weeks after delivery).     Call your health care provider if you have any of these symptoms:       Increased pain, swelling, redness, or fluid around your stiches from an episiotomy or perineal tear.    A fever above 100.4 F (38 C) with or without chills when placing a thermometer under your tongue.    You soak a sanitary pad with blood within 1 hour, or you see blood clots larger than a golf ball.    Bleeding that lasts more than 6 weeks.    Vaginal discharge that smells bad.    Severe pain, cramping or tenderness in your lower belly area.    A need to urinate more frequently (use the toilet more often), more urgently (use the toilet very quickly), or it burns when you urinate.    Nausea and vomiting.    Redness, swelling or pain around a vein in your leg.    Problems breastfeeding or a red or painful area on your breast.    Chest pain and cough or are gasping for air.    Problems coping with sadness, anxiety, or depression.  If you have any concerns about hurting yourself or the baby, call your provider immediately.     You have questions or concerns after you return home.     Keep your hands clean:  Always wash your hands before touching your perineal area and stitches.  This helps reduce your risk of infection.  If your hands aren't dirty, you may use an alcohol hand-rub to clean your hands. Keep your nails clean and short.    Congratulations!     Please call the office if you have  - vaginal bleeding soaking through more than a pad per hour  - blood clots greater than the size of geovanny  -  temperature >100.4  - pain not controlled with oral medications  - inability to tolerate food and drink.  - persistent headache, vision changes, chest pain, shortness of breath, pain in your upper belly    Please make postpartum appointments in the office in 1 and 6 weeks.

## 2018-07-02 NOTE — PROGRESS NOTES
"Waqar VIKRAM Hogue  2018   PPD2    S: 37 year old  delivered at 37w3d   Feeling very emotional. Baby has had to be under the bili lights and she's very worried. Not able to sleep so that makes things worse.  Sore but medication helping.   Lochia moderate.   Ambulating.  Urinating without issues.  Breastfeeding and pumping.    O: /65  Pulse 89  Temp 98.5  F (36.9  C) (Oral)  Resp 16  Ht 1.727 m (5' 8\")  Wt 79.8 kg (176 lb)  SpO2 96%  Breastfeeding? Unknown  BMI 26.76 kg/m2  Gen: tearful  Abd: soft, NT, ND, FF 2 below U  Ext: NT, nonedematous    # Pain Assessment:  Current Pain Score 2018   Patient currently in pain? -   Pain score (0-10) 6   Pain location -   Pain descriptors -       A/P:  37 year old  PPD2  - ibuprofen, acetaminophen, and oxycodone PRN pain - will give Rx for these including small Rx for oxycodone #10  - support breastfeeding  - monitor lochia  - encourage ambulation  - regular diet  - routine PP care  - monitor mood - will have her f/u in the office in 1 week to check-in  - stable for discharge    Danielle Albright MD  Text Page (8am - 5pm)  "

## 2018-07-03 ENCOUNTER — LACTATION ENCOUNTER (OUTPATIENT)
Age: 38
End: 2018-07-03

## 2018-07-03 LAB — COPATH REPORT: NORMAL

## 2018-07-03 NOTE — LACTATION NOTE
This note was copied from a baby's chart.  Follow up visit.  Infant feeding well.  Breast feeding then supplementing after by bottle.  Waqar feels her breasts are starting to fill.  Encouraged her to pump after.  She felt good about the feeding plan.  Waqar was much more relaxed today, she said she felt much better and was able to get a nap.  Reviewed outpatient lactation resources for follow up if needed.  Shirin Smart RN, IBCLC

## 2018-08-17 ENCOUNTER — ANESTHESIA EVENT (OUTPATIENT)
Dept: SURGERY | Facility: CLINIC | Age: 38
End: 2018-08-17
Payer: COMMERCIAL

## 2018-08-17 ENCOUNTER — SURGERY (OUTPATIENT)
Age: 38
End: 2018-08-17

## 2018-08-17 ENCOUNTER — ANESTHESIA (OUTPATIENT)
Dept: SURGERY | Facility: CLINIC | Age: 38
End: 2018-08-17
Payer: COMMERCIAL

## 2018-08-17 ENCOUNTER — HOSPITAL ENCOUNTER (OUTPATIENT)
Facility: CLINIC | Age: 38
Discharge: HOME | End: 2018-08-17
Attending: OBSTETRICS & GYNECOLOGY | Admitting: OBSTETRICS & GYNECOLOGY
Payer: COMMERCIAL

## 2018-08-17 VITALS
HEIGHT: 68 IN | SYSTOLIC BLOOD PRESSURE: 103 MMHG | TEMPERATURE: 98.6 F | BODY MASS INDEX: 21.98 KG/M2 | DIASTOLIC BLOOD PRESSURE: 75 MMHG | RESPIRATION RATE: 16 BRPM | WEIGHT: 145 LBS | OXYGEN SATURATION: 98 %

## 2018-08-17 PROBLEM — O09.812 PREGNANCY RESULTING FROM IN VITRO FERTILIZATION IN SECOND TRIMESTER: Status: RESOLVED | Noted: 2018-02-07 | Resolved: 2018-08-17

## 2018-08-17 PROBLEM — O41.00X0 OLIGOHYDRAMNIOS: Status: RESOLVED | Noted: 2018-06-27 | Resolved: 2018-08-17

## 2018-08-17 PROBLEM — O09.519 SUPERVISION OF HIGH-RISK PREGNANCY OF ELDERLY PRIMIGRAVIDA: Status: RESOLVED | Noted: 2017-12-20 | Resolved: 2018-08-17

## 2018-08-17 PROCEDURE — 25000128 H RX IP 250 OP 636: Performed by: ANESTHESIOLOGY

## 2018-08-17 PROCEDURE — 27210794 ZZH OR GENERAL SUPPLY STERILE: Performed by: OBSTETRICS & GYNECOLOGY

## 2018-08-17 PROCEDURE — 25000125 ZZHC RX 250: Performed by: NURSE ANESTHETIST, CERTIFIED REGISTERED

## 2018-08-17 PROCEDURE — 71000012 ZZH RECOVERY PHASE 1 LEVEL 1 FIRST HR: Performed by: OBSTETRICS & GYNECOLOGY

## 2018-08-17 PROCEDURE — 36000050 ZZH SURGERY LEVEL 2 1ST 30 MIN: Performed by: OBSTETRICS & GYNECOLOGY

## 2018-08-17 PROCEDURE — 40000170 ZZH STATISTIC PRE-PROCEDURE ASSESSMENT II: Performed by: OBSTETRICS & GYNECOLOGY

## 2018-08-17 PROCEDURE — 25000132 ZZH RX MED GY IP 250 OP 250 PS 637: Performed by: OBSTETRICS & GYNECOLOGY

## 2018-08-17 PROCEDURE — 37000008 ZZH ANESTHESIA TECHNICAL FEE, 1ST 30 MIN: Performed by: OBSTETRICS & GYNECOLOGY

## 2018-08-17 PROCEDURE — 25000128 H RX IP 250 OP 636: Performed by: NURSE ANESTHETIST, CERTIFIED REGISTERED

## 2018-08-17 PROCEDURE — 88305 TISSUE EXAM BY PATHOLOGIST: CPT | Performed by: OBSTETRICS & GYNECOLOGY

## 2018-08-17 PROCEDURE — 71000027 ZZH RECOVERY PHASE 2 EACH 15 MINS: Performed by: OBSTETRICS & GYNECOLOGY

## 2018-08-17 PROCEDURE — 37000009 ZZH ANESTHESIA TECHNICAL FEE, EACH ADDTL 15 MIN: Performed by: OBSTETRICS & GYNECOLOGY

## 2018-08-17 PROCEDURE — 88305 TISSUE EXAM BY PATHOLOGIST: CPT | Mod: 26 | Performed by: OBSTETRICS & GYNECOLOGY

## 2018-08-17 RX ORDER — ONDANSETRON 2 MG/ML
4 INJECTION INTRAMUSCULAR; INTRAVENOUS EVERY 30 MIN PRN
Status: DISCONTINUED | OUTPATIENT
Start: 2018-08-17 | End: 2018-08-17 | Stop reason: HOSPADM

## 2018-08-17 RX ORDER — ACETAMINOPHEN 325 MG/1
650 TABLET ORAL EVERY 4 HOURS PRN
Qty: 100 TABLET | Refills: 0 | Status: ON HOLD | OUTPATIENT
Start: 2018-08-17 | End: 2020-09-26

## 2018-08-17 RX ORDER — LIDOCAINE HYDROCHLORIDE 20 MG/ML
INJECTION, SOLUTION INFILTRATION; PERINEURAL PRN
Status: DISCONTINUED | OUTPATIENT
Start: 2018-08-17 | End: 2018-08-17

## 2018-08-17 RX ORDER — ONDANSETRON 2 MG/ML
INJECTION INTRAMUSCULAR; INTRAVENOUS PRN
Status: DISCONTINUED | OUTPATIENT
Start: 2018-08-17 | End: 2018-08-17

## 2018-08-17 RX ORDER — PROPOFOL 10 MG/ML
INJECTION, EMULSION INTRAVENOUS PRN
Status: DISCONTINUED | OUTPATIENT
Start: 2018-08-17 | End: 2018-08-17

## 2018-08-17 RX ORDER — NALOXONE HYDROCHLORIDE 0.4 MG/ML
.1-.4 INJECTION, SOLUTION INTRAMUSCULAR; INTRAVENOUS; SUBCUTANEOUS
Status: DISCONTINUED | OUTPATIENT
Start: 2018-08-17 | End: 2018-08-17 | Stop reason: HOSPADM

## 2018-08-17 RX ORDER — ONDANSETRON 4 MG/1
4 TABLET, ORALLY DISINTEGRATING ORAL EVERY 30 MIN PRN
Status: DISCONTINUED | OUTPATIENT
Start: 2018-08-17 | End: 2018-08-17 | Stop reason: HOSPADM

## 2018-08-17 RX ORDER — ACETAMINOPHEN 325 MG/1
975 TABLET ORAL ONCE
Status: COMPLETED | OUTPATIENT
Start: 2018-08-17 | End: 2018-08-17

## 2018-08-17 RX ORDER — FENTANYL CITRATE 50 UG/ML
25-50 INJECTION, SOLUTION INTRAMUSCULAR; INTRAVENOUS
Status: DISCONTINUED | OUTPATIENT
Start: 2018-08-17 | End: 2018-08-17 | Stop reason: HOSPADM

## 2018-08-17 RX ORDER — SODIUM CHLORIDE, SODIUM LACTATE, POTASSIUM CHLORIDE, CALCIUM CHLORIDE 600; 310; 30; 20 MG/100ML; MG/100ML; MG/100ML; MG/100ML
INJECTION, SOLUTION INTRAVENOUS CONTINUOUS PRN
Status: DISCONTINUED | OUTPATIENT
Start: 2018-08-17 | End: 2018-08-17

## 2018-08-17 RX ORDER — DEXAMETHASONE SODIUM PHOSPHATE 4 MG/ML
INJECTION, SOLUTION INTRA-ARTICULAR; INTRALESIONAL; INTRAMUSCULAR; INTRAVENOUS; SOFT TISSUE PRN
Status: DISCONTINUED | OUTPATIENT
Start: 2018-08-17 | End: 2018-08-17

## 2018-08-17 RX ORDER — FENTANYL CITRATE 50 UG/ML
INJECTION, SOLUTION INTRAMUSCULAR; INTRAVENOUS PRN
Status: DISCONTINUED | OUTPATIENT
Start: 2018-08-17 | End: 2018-08-17

## 2018-08-17 RX ORDER — MEPERIDINE HYDROCHLORIDE 25 MG/ML
12.5 INJECTION INTRAMUSCULAR; INTRAVENOUS; SUBCUTANEOUS
Status: DISCONTINUED | OUTPATIENT
Start: 2018-08-17 | End: 2018-08-17 | Stop reason: HOSPADM

## 2018-08-17 RX ORDER — SODIUM CHLORIDE, SODIUM LACTATE, POTASSIUM CHLORIDE, CALCIUM CHLORIDE 600; 310; 30; 20 MG/100ML; MG/100ML; MG/100ML; MG/100ML
INJECTION, SOLUTION INTRAVENOUS CONTINUOUS
Status: DISCONTINUED | OUTPATIENT
Start: 2018-08-17 | End: 2018-08-17 | Stop reason: HOSPADM

## 2018-08-17 RX ORDER — PROPOFOL 10 MG/ML
INJECTION, EMULSION INTRAVENOUS CONTINUOUS PRN
Status: DISCONTINUED | OUTPATIENT
Start: 2018-08-17 | End: 2018-08-17

## 2018-08-17 RX ORDER — KETOROLAC TROMETHAMINE 30 MG/ML
INJECTION, SOLUTION INTRAMUSCULAR; INTRAVENOUS PRN
Status: DISCONTINUED | OUTPATIENT
Start: 2018-08-17 | End: 2018-08-17

## 2018-08-17 RX ORDER — OXYCODONE HYDROCHLORIDE 5 MG/1
5 TABLET ORAL
Status: DISCONTINUED | OUTPATIENT
Start: 2018-08-17 | End: 2018-08-17 | Stop reason: HOSPADM

## 2018-08-17 RX ORDER — OXYCODONE HYDROCHLORIDE 5 MG/1
5-10 TABLET ORAL
Qty: 5 TABLET | Refills: 0 | Status: ON HOLD | OUTPATIENT
Start: 2018-08-17 | End: 2020-09-26

## 2018-08-17 RX ORDER — IBUPROFEN 600 MG/1
600 TABLET, FILM COATED ORAL EVERY 6 HOURS PRN
Qty: 30 TABLET | Refills: 0 | Status: ON HOLD | OUTPATIENT
Start: 2018-08-17 | End: 2020-09-26

## 2018-08-17 RX ADMIN — FENTANYL CITRATE 50 MCG: 50 INJECTION, SOLUTION INTRAMUSCULAR; INTRAVENOUS at 13:51

## 2018-08-17 RX ADMIN — KETOROLAC TROMETHAMINE 30 MG: 30 INJECTION, SOLUTION INTRAMUSCULAR at 14:10

## 2018-08-17 RX ADMIN — DEXAMETHASONE SODIUM PHOSPHATE 4 MG: 4 INJECTION, SOLUTION INTRA-ARTICULAR; INTRALESIONAL; INTRAMUSCULAR; INTRAVENOUS; SOFT TISSUE at 13:57

## 2018-08-17 RX ADMIN — LIDOCAINE HYDROCHLORIDE 100 MG: 20 INJECTION, SOLUTION INFILTRATION; PERINEURAL at 13:51

## 2018-08-17 RX ADMIN — PROPOFOL 175 MCG/KG/MIN: 10 INJECTION, EMULSION INTRAVENOUS at 13:51

## 2018-08-17 RX ADMIN — PROPOFOL 200 MG: 10 INJECTION, EMULSION INTRAVENOUS at 13:51

## 2018-08-17 RX ADMIN — ONDANSETRON 4 MG: 2 INJECTION INTRAMUSCULAR; INTRAVENOUS at 13:57

## 2018-08-17 RX ADMIN — ACETAMINOPHEN 650 MG: 325 TABLET, FILM COATED ORAL at 13:41

## 2018-08-17 RX ADMIN — SODIUM CHLORIDE, POTASSIUM CHLORIDE, SODIUM LACTATE AND CALCIUM CHLORIDE: 600; 310; 30; 20 INJECTION, SOLUTION INTRAVENOUS at 13:48

## 2018-08-17 RX ADMIN — FENTANYL CITRATE 50 MCG: 50 INJECTION, SOLUTION INTRAMUSCULAR; INTRAVENOUS at 14:38

## 2018-08-17 RX ADMIN — MIDAZOLAM 1 MG: 1 INJECTION INTRAMUSCULAR; INTRAVENOUS at 13:51

## 2018-08-17 ASSESSMENT — ENCOUNTER SYMPTOMS: SEIZURES: 0

## 2018-08-17 NOTE — IP AVS SNAPSHOT
[facility]    [address]    [place] [address]    Phone:  [phone]    Fax:  [phone]                                       After Visit Summary   8/17/2018    Waqar Stokes    MRN: [id number]           After Visit Summary Signature Page     I have received my discharge instructions, and my questions have been answered. I have discussed any challenges I see with this plan with the nurse or doctor.    ..........................................................................................................................................  Patient/Patient Representative Signature      ..........................................................................................................................................  Patient Representative Print Name and Relationship to Patient    ..................................................               ................................................  Date                                            Time    ..........................................................................................................................................  Reviewed by Signature/Title    ...................................................              ..............................................  Date                                                            Time

## 2018-08-17 NOTE — IP AVS SNAPSHOT
MRN:[id number]                      After Visit Summary   8/17/2018    Waqar Stokes    MRN: [id number]           Thank you!     Thank you for choosing [facility] for your care. Our goal is always to provide you with excellent care. Hearing back from our patients is one way we can continue to improve our services. Please take a few minutes to complete the written survey that you may receive in the mail after you visit with us. Thank you!        Patient Information     Date Of Birth          1980        About your hospital stay     You were admitted on:  August 17, 2018 You last received care in the:  [facility] Same Day Surgery    You were discharged on:  August 17, 2018       Who to Call     For medical emergencies, please call 911.  For non-urgent questions about your medical care, please call your primary care provider or clinic, [phone]  For questions related to your surgery, please call your surgery clinic        Attending Provider     Provider Danielle Williamson MD OB/Gyn       Primary Care Provider Office Phone # Fax #    [facility] [phone]      Further instructions from your care team       Please take ibuprofen for pain and alternate with acetaminophen if needed. You were given a small prescription for oxycodone for any pain experienced after trying these other medications. If you are requiring the oxycodone persistently, please call the office/answering service.    Please call the office if you experience  - bleeding through more than one pad per hour persistently  - passage of blood clots greater than the size of geovanny  - temperature greater than 100.4  - abnormal vaginal discharge  - inability to tolerate food or fluids  - pain uncontrolled with oral medications.    Please make an appointment to follow-up in the office in 2 weeks.      [facility]  D & C Surgery  Discharge  Instructions  ACTIVITY:   You may resume normal activities including lifting as needed. It is permissible to drive a car and to climb stairs. Baths or showers are perfectly acceptable.   CHECK-UP:   You should be seen 1 month after discharge unless home instruction sheet states otherwise. Please phone the office the day after procedure and schedule an appointment with your physician.  VAGINAL DISCHARGE:   You may have some vaginal bleeding or discharge for about a week after discharge. You should avoid douches, tampons, and intercourse for the first week.  TEMPERATURE:   If you develop temperature levels to over 100.4 , your physician should be called immediately.  STITCHES:   There is usually a stitch under the skin incisions which will dissolve and does not need to be removed. The bandaids may be removed at any time.  DIET:  Alexander or light diet is advisable the day of surgery. If nausea persists, continue this diet. If the nausea is severe, call the physician.  [facility]  [address] [facility], [address]  [place] [address]  [phone]       Same Day Surgery Discharge Instructions for  Sedation and General Anesthesia       It's not unusual to feel dizzy, light-headed or faint for up to 24 hours after surgery or while taking pain medication.  If you have these symptoms: sit for a few minutes before standing and have someone assist you when you get up to walk or use the bathroom.      You should rest and relax for the next 24 hours. We recommend you make arrangements to have an adult stay with you for at least 24 hours after your discharge.  Avoid hazardous and strenuous activity.      DO NOT DRIVE any vehicle or operate mechanical equipment for 24 hours following the end of your surgery.  Even though you may feel normal, your reactions may be affected by the medication you have received.      Do not drink alcoholic beverages for 24 hours following surgery.       Slowly progress to your  regular diet as you feel able. It's not unusual to feel nauseated and/or vomit after receiving anesthesia.  If you develop these symptoms, drink clear liquids (apple juice, ginger ale, broth, 7-up, etc. ) until you feel better.  If your nausea and vomiting persists for 24 hours, please notify your surgeon.        All narcotic pain medications, along with inactivity and anesthesia, can cause constipation. Drinking plenty of liquids and increasing fiber intake will help.      For any questions of a medical nature, call your surgeon.      Do not make important decisions for 24 hours.      If you had general anesthesia, you may have a sore throat for a couple of days related to the breathing tube used during surgery.  You may use Cepacol lozenges to help with this discomfort.  If it worsens or if you develop a fever, contact your surgeon.     If you feel your pain is not well managed with the pain medications prescribed by your surgeon, please contact your surgeon's office to let them know so they can address your concerns.     While you were at the hospital today,  you were given 975 mg of Tylenol at 1:40 PM 8/17/2018. The recommended 24 hour dose is 4000 mg.    Today you received Toradol, an antiinflammatory medication similar to Ibuprofen.  You should not take other antiinflammatory medication, such as Ibuprofen, Motrin, Advil, Aleve, Naprosyn, etc until 8:10pm.       **If you have questions or concerns about your procedure,   call Dr Danielle Albright at  [phone]**              Pending Results     Date and Time Order Name Status Description    8/17/2018 1434 Placenta path order and indications In process     8/17/2018 1406 Surgical pathology exam In process             Admission Information     Date & Time Provider Department Dept. Phone    8/17/2018 Danielle Albright MD [facility] Same Day Surgery [phone]      Your Vitals Were     Blood Pressure Temperature Respirations Height Weight Pulse Oximetry  "   104/64 97.2  F (36.2  C) 12 1.727 m (5' 8\") 65.8 kg (145 lb) 96%    BMI (Body Mass Index)                   22.05 kg/m2           [employer] Information     [employer] lets you send messages to your doctor, view your test results, renew your prescriptions, schedule appointments and more. To sign up, go to [email or web address] . Click on \"Log in\" on the left side of the screen, which will take you to the Welcome page. Then click on \"Sign up Now\" on the right side of the page.     You will be asked to enter the access code listed below, as well as some personal information. Please follow the directions to create your username and password.     Your access code is: [email or web address]  Expires: 2018  9:06 AM     Your access code will  in 90 days. If you need help or a new code, please call your [facility] clinic or [phone].        Care EveryWhere ID     This is your Care EveryWhere ID. This could be used by other organizations to access your [facility] medical records  [id number]        Equal Access to Services     LUIS ANTONIO BARON AH: Hadii conchita Li, waalexda sushant, qaron kaalmada padma, [facility]. So [facility] [phone].    ATENCIÓN: Si habla español, tiene a contreras disposición servicios gratuitos de asistencia lingüística. David al [phone].    We comply with applicable federal civil rights laws and [place] laws. We do not discriminate on the basis of race, color, national origin, age, disability, sex, sexual orientation, or gender identity.               Review of your medicines      UNREVIEWED medicines. Ask your doctor about these medicines        Dose / Directions    * acetaminophen 325 MG tablet   Commonly known as:  TYLENOL   This may have changed:  Another medication with the same name was added. Make sure you understand how and when to take each.   Used for:  Normal spontaneous vaginal delivery   Ask about: Which instructions should I use?        Dose:  " 650 mg   Take 2 tablets (650 mg) by mouth every 4 hours as needed for mild pain (greater than or equal to 38? C /100.4? F (oral) or 38.5? C/ 101.4? F (core).)   Quantity:  100 tablet   Refills:  0       * acetaminophen 325 MG tablet   Commonly known as:  TYLENOL   This may have changed:  You were already taking a medication with the same name, and this prescription was added. Make sure you understand how and when to take each.   Ask about: Which instructions should I use?        Dose:  650 mg   Take 2 tablets (650 mg) by mouth every 4 hours as needed for other (mild pain)   Quantity:  100 tablet   Refills:  0       * clindamycin 1 % solution   Commonly known as:  CLEOCIN-T   Used for:  Acne vulgaris        Apply topically 2 times daily   Quantity:  60 mL   Refills:  3       * clindamycin 1 % lotion   Commonly known as:  CLEOCIN T        APPLY TO AFFECTED AREAS OF ACNE D   Refills:  10       * ibuprofen 800 MG tablet   Commonly known as:  ADVIL/MOTRIN   This may have changed:  Another medication with the same name was added. Make sure you understand how and when to take each.   Used for:  Normal spontaneous vaginal delivery   Ask about: Which instructions should I use?   Notes to Patient:  Do not take this dose while taking Ibuprofen 600mg every six hours.        Dose:  800 mg   Take 1 tablet (800 mg) by mouth every 6 hours as needed for other (cramping)   Quantity:  60 tablet   Refills:  0       * ibuprofen 600 MG tablet   Commonly known as:  ADVIL/MOTRIN   This may have changed:  You were already taking a medication with the same name, and this prescription was added. Make sure you understand how and when to take each.   Ask about: Which instructions should I use?   Notes to Patient:  May take at 8:10pm        Dose:  600 mg   Take 1 tablet (600 mg) by mouth every 6 hours as needed for pain (mild)   Quantity:  30 tablet   Refills:  0       * oxyCODONE IR 5 MG tablet   Commonly known as:  ROXICODONE   This may have  changed:  Another medication with the same name was added. Make sure you understand how and when to take each.   Used for:  Normal spontaneous vaginal delivery   Ask about: Which instructions should I use?        Dose:  5 mg   Take 1 tablet (5 mg) by mouth every 4 hours as needed for moderate to severe pain   Quantity:  10 tablet   Refills:  0       * oxyCODONE IR 5 MG tablet   Commonly known as:  ROXICODONE   This may have changed:  You were already taking a medication with the same name, and this prescription was added. Make sure you understand how and when to take each.   Ask about: Which instructions should I use?        Dose:  5-10 mg   Take 1-2 tablets (5-10 mg) by mouth every 3 hours as needed for pain or other (Moderate to Severe)   Quantity:  5 tablet   Refills:  0       prenatal multivitamin plus iron 27-0.8 MG Tabs per tablet        Dose:  1 tablet   Take 1 tablet by mouth daily   Refills:  0       * Notice:  This list has 8 medication(s) that are the same as other medications prescribed for you. Read the directions carefully, and ask your doctor or other care provider to review them with you.         Where to get your medicines      Some of these will need a paper prescription and others can be bought over the counter. Ask your nurse if you have questions.     Bring a paper prescription for each of these medications     acetaminophen 325 MG tablet    ibuprofen 600 MG tablet    oxyCODONE IR 5 MG tablet                Protect others around you: Learn how to safely use, store and throw away your medicines at www.disposemymeds.org.        Information about OPIOIDS     PRESCRIPTION OPIOIDS: WHAT YOU NEED TO KNOW   We gave you an opioid (narcotic) pain medicine. It is important to manage your pain, but opioids are not always the best choice. You should first try all the other options your care team gave you. Take this medicine for as short a time (and as few doses) as possible.    Some activities can increase  your pain, such as bandage changes or therapy sessions. It may help to take your pain medicine 30 to 60 minutes before these activities. Reduce your stress by getting enough sleep, working on hobbies you enjoy and practicing relaxation or meditation. Talk to your care team about ways to manage your pain beyond prescription opioids.    These medicines have risks:    DO NOT drive when on new or higher doses of pain medicine. These medicines can affect your alertness and reaction times, and you could be arrested for driving under the influence (DUI). If you need to use opioids long-term, talk to your care team about driving.    DO NOT operate heavy machinery    DO NOT do any other dangerous activities while taking these medicines.    DO NOT drink any alcohol while taking these medicines.     If the opioid prescribed includes acetaminophen, DO NOT take with any other medicines that contain acetaminophen. Read all labels carefully. Look for the word  acetaminophen  or  Tylenol.  Ask your pharmacist if you have questions or are unsure.    You can get addicted to pain medicines, especially if you have a history of addiction (chemical, alcohol or substance dependence). Talk to your care team about ways to reduce this risk.    All opioids tend to cause constipation. Drink plenty of water and eat foods that have a lot of fiber, such as fruits, vegetables, prune juice, apple juice and high-fiber cereal. Take a laxative (Miralax, milk of magnesia, Colace, Senna) if you don t move your bowels at least every other day. Other side effects include upset stomach, sleepiness, dizziness, throwing up, tolerance (needing more of the medicine to have the same effect), physical dependence and slowed breathing.    Store your pills in a secure place, locked if possible. We will not replace any lost or stolen medicine. If you don t finish your medicine, please throw away (dispose) as directed by your pharmacist. The [place] Pollution  Control Agency has more information about safe disposal: https://www.pca.[facility].us/living-green/managing-unwanted-medications             Medication List: This is a list of all your medications and when to take them. Check marks below indicate your daily home schedule. Keep this list as a reference.      Medications           Morning Afternoon Evening Bedtime As Needed    * clindamycin 1 % solution   Commonly known as:  CLEOCIN-T   Apply topically 2 times daily                                * clindamycin 1 % lotion   Commonly known as:  CLEOCIN T   APPLY TO AFFECTED AREAS OF ACNE D                                prenatal multivitamin plus iron 27-0.8 MG Tabs per tablet   Take 1 tablet by mouth daily                                * Notice:  This list has 2 medication(s) that are the same as other medications prescribed for you. Read the directions carefully, and ask your doctor or other care provider to review them with you.      ASK your doctor about these medications           Morning Afternoon Evening Bedtime As Needed    * acetaminophen 325 MG tablet   Commonly known as:  TYLENOL   Take 2 tablets (650 mg) by mouth every 4 hours as needed for mild pain (greater than or equal to 38? C /100.4? F (oral) or 38.5? C/ 101.4? F (core).)   Last time this was given:  650 mg on 8/17/2018  1:41 PM   Ask about: Which instructions should I use?                                * acetaminophen 325 MG tablet   Commonly known as:  TYLENOL   Take 2 tablets (650 mg) by mouth every 4 hours as needed for other (mild pain)   Last time this was given:  650 mg on 8/17/2018  1:41 PM   Ask about: Which instructions should I use?                                * ibuprofen 800 MG tablet   Commonly known as:  ADVIL/MOTRIN   Take 1 tablet (800 mg) by mouth every 6 hours as needed for other (cramping)   Ask about: Which instructions should I use?   Notes to Patient:  Do not take this dose while taking Ibuprofen 600mg every six hours.                                 * ibuprofen 600 MG tablet   Commonly known as:  ADVIL/MOTRIN   Take 1 tablet (600 mg) by mouth every 6 hours as needed for pain (mild)   Ask about: Which instructions should I use?   Notes to Patient:  May take at 8:10pm                                * oxyCODONE IR 5 MG tablet   Commonly known as:  ROXICODONE   Take 1 tablet (5 mg) by mouth every 4 hours as needed for moderate to severe pain   Ask about: Which instructions should I use?                                * oxyCODONE IR 5 MG tablet   Commonly known as:  ROXICODONE   Take 1-2 tablets (5-10 mg) by mouth every 3 hours as needed for pain or other (Moderate to Severe)   Ask about: Which instructions should I use?                                * Notice:  This list has 6 medication(s) that are the same as other medications prescribed for you. Read the directions carefully, and ask your doctor or other care provider to review them with you.              More Information        Discharge Instructions for Dilation and Curettage (D and C)  You had a dilation and curettage (D&C).   Home care    Take it easy. Rest for 1-2 days as needed.    Return to your normal activities after 24 to 48 hours. You may also return to work at that time.    Eat a normal diet.    Take an over-the-counter pain reliever for pain, if needed.    Remember, it s OK to have bleeding for about a week after the procedure. This is typically spotting or light bleeding.     Don t drive for 24 hours after the procedure unless specifically told by your provider that it is OK to do so.    Don t have sex or use tampons or douches until your doctor says it s safe to do so.  Follow-up    Make a follow-up appointment, or as directed.     When to call your doctor  Call your doctor right away if you have any of the following:    Bleeding that soaks more than one sanitary pad in one hour    Severe abdominal pain    Severe cramps    Fever above 100.4 F (38.0 C)    A foul smelling  vaginal discharge   Date Last Reviewed: 10/1/2017    [id number] [employer]. [address] [place] [address], [place] [address]. All rights reserved. This information is not intended as a substitute for professional medical care. Always follow your healthcare professional's instructions.

## 2018-08-17 NOTE — ANESTHESIA PREPROCEDURE EVALUATION
Procedure: Procedure(s):  DILATION AND CURETTAGE SUCTION  Preop diagnosis: RETAINED PRODUCTS OF CONCEPTION  No Known Allergies  Patient Active Problem List   Diagnosis     Supervision of high-risk pregnancy of elderly primigravida     Pregnancy resulting from in vitro fertilization in second trimester     Oligohydramnios     Normal spontaneous vaginal delivery     Past Medical History:   Diagnosis Date     History of recurrent miscarriages 2017    SAB 7wks 1d (summer 2017) no chromosomal abnormalities  had D&C, prior to 2007, early on, no management needed to be done     Infertility, female     followed by [facility]; s/p IVF x 2     Past Surgical History:   Procedure Laterality Date     GYN SURGERY  09/2017    D&C for miscarriage       No current facility-administered medications on file prior to encounter.   Current Outpatient Prescriptions on File Prior to Encounter:  acetaminophen (TYLENOL) 325 MG tablet Take 2 tablets (650 mg) by mouth every 4 hours as needed for mild pain (greater than or equal to 38  C /100.4  F (oral) or 38.5  C/ 101.4  F (core).)   clindamycin (CLEOCIN T) 1 % lotion APPLY TO AFFECTED AREAS OF ACNE D   clindamycin (CLEOCIN-T) 1 % solution Apply topically 2 times daily   ibuprofen (ADVIL/MOTRIN) 800 MG tablet Take 1 tablet (800 mg) by mouth every 6 hours as needed for other (cramping)   oxyCODONE IR (ROXICODONE) 5 MG tablet Take 1 tablet (5 mg) by mouth every 4 hours as needed for moderate to severe pain   Prenatal Vit-Fe Fumarate-FA (PRENATAL MULTIVITAMIN PLUS IRON) 27-0.8 MG TABS per tablet Take 1 tablet by mouth daily     There were no vitals taken for this visit.    Lab Results   Component Value Date    WBC 13.5 06/29/2018     Lab Results   Component Value Date    RBC 3.72 06/29/2018     Lab Results   Component Value Date    HGB 11.0 06/29/2018     Lab Results   Component Value Date    HCT 33.3 06/29/2018     Lab Results   Component Value Date    MCV 90 06/29/2018     Lab Results   Component  Value Date    MCH 29.6 2018     Lab Results   Component Value Date    MCHC 33.0 2018     Lab Results   Component Value Date    RDW 13.8 2018     Lab Results   Component Value Date     2018     No results found for: INR      Anesthesia Evaluation     . Pt has had prior anesthetic.            ROS/MED HX    ENT/Pulmonary:  - neg pulmonary ROS    (-) sleep apnea   Neurologic:  - neg neurologic ROS    (-) seizures, CVA and migraines   Cardiovascular:         METS/Exercise Tolerance:     Hematologic:  - neg hematologic  ROS       Musculoskeletal:  - neg musculoskeletal ROS       GI/Hepatic:  - neg GI/hepatic ROS      (-) GERD   Renal/Genitourinary:  - ROS Renal section negative       Endo:  - neg endo ROS    (-) Type II DM and thyroid disease   Psychiatric:  - neg psychiatric ROS       Infectious Disease:  - neg infectious disease ROS       Malignancy:      - no malignancy   Other: Comment: Retained products of conception/ vaginal bleeding  S/p  2018 with labor epidural    Hx of infertility  Hx of recurrent miscarriage     Patient is breast feeding                    Physical Exam  Normal systems: cardiovascular, pulmonary and dental    Airway   Mallampati: I  TM distance: >3 FB  Neck ROM: full    Dental     Cardiovascular   Rhythm and rate: regular and normal      Pulmonary    breath sounds clear to auscultation                    Anesthesia Plan      History & Physical Review  History and physical reviewed and following examination; no interval change.    ASA Status:  1 .    NPO Status:  > 8 hours    Plan for General and LMA   PONV prophylaxis:  Ondansetron (or other 5HT-3)  Propofol infusion      Postoperative Care  Postoperative pain management:  IV analgesics.      Consents  Anesthetic plan, risks, benefits and alternatives discussed with:  Patient..                          .

## 2018-08-17 NOTE — ANESTHESIA CARE TRANSFER NOTE
Patient: Waqar Stokes    Procedure(s):  SUCTION DILATION AND CURETTAGE - Wound Class: II-Clean Contaminated    Diagnosis: RETAINED PRODUCTS OF CONCEPTION  Diagnosis Additional Information: No value filed.    Anesthesia Type:   General, LMA     Note:  Airway :Face Mask  Patient transferred to:PACU  Handoff Report: Identifed the Patient, Identified the Reponsible Provider, Reviewed the pertinent medical history, Discussed the surgical course, Reviewed Intra-OP anesthesia mangement and issues during anesthesia, Set expectations for post-procedure period and Allowed opportunity for questions and acknowledgement of understanding      Vitals: (Last set prior to Anesthesia Care Transfer)    CRNA VITALS  8/17/2018 1347 - 8/17/2018 1422      8/17/2018             Pulse: 55    SpO2: 99 %    Resp Rate (observed): 9                Electronically Signed By: VIRGILIO Haley CRNA  August 17, 2018  2:22 PM

## 2018-08-17 NOTE — ANESTHESIA POSTPROCEDURE EVALUATION
Patient: Waqar Stokes    Procedure(s):  SUCTION DILATION AND CURETTAGE - Wound Class: II-Clean Contaminated    Diagnosis:RETAINED PRODUCTS OF CONCEPTION  Diagnosis Additional Information: No value filed.    Anesthesia Type:  General, LMA    Note:  Anesthesia Post Evaluation    Patient location during evaluation: PACU  Patient participation: Able to fully participate in evaluation  Level of consciousness: awake  Pain management: adequate  Airway patency: patent  Cardiovascular status: acceptable  Respiratory status: acceptable  Hydration status: acceptable  PONV: none     Anesthetic complications: None          Last vitals:  Vitals:    08/17/18 1430 08/17/18 1438 08/17/18 1443   BP: 104/64     Resp: 12 12 12   Temp: 36.1  C (97  F)  36.2  C (97.2  F)   SpO2: 100%  96%         Electronically Signed By: Seth Miner MD  August 17, 2018  3:04 PM

## 2018-08-17 NOTE — DISCHARGE INSTRUCTIONS
Please take ibuprofen for pain and alternate with acetaminophen if needed. You were given a small prescription for oxycodone for any pain experienced after trying these other medications. If you are requiring the oxycodone persistently, please call the office/answering service.    Please call the office if you experience  - bleeding through more than one pad per hour persistently  - passage of blood clots greater than the size of geovanny  - temperature greater than 100.4  - abnormal vaginal discharge  - inability to tolerate food or fluids  - pain uncontrolled with oral medications.    Please make an appointment to follow-up in the office in 2 weeks.      [facility]  D & C Surgery  Discharge Instructions  ACTIVITY:   You may resume normal activities including lifting as needed. It is permissible to drive a car and to climb stairs. Baths or showers are perfectly acceptable.   CHECK-UP:   You should be seen 1 month after discharge unless home instruction sheet states otherwise. Please phone the office the day after procedure and schedule an appointment with your physician.  VAGINAL DISCHARGE:   You may have some vaginal bleeding or discharge for about a week after discharge. You should avoid douches, tampons, and intercourse for the first week.  TEMPERATURE:   If you develop temperature levels to over 100.4 , your physician should be called immediately.  STITCHES:   There is usually a stitch under the skin incisions which will dissolve and does not need to be removed. The bandaids may be removed at any time.  DIET:  Hayden or light diet is advisable the day of surgery. If nausea persists, continue this diet. If the nausea is severe, call the physician.  [facility]  [address], [address]  [place] [address]  [phone]       Same Day Surgery Discharge Instructions for  Sedation and General Anesthesia       It's not unusual to feel dizzy, light-headed or faint for up to  24 hours after surgery or while taking pain medication.  If you have these symptoms: sit for a few minutes before standing and have someone assist you when you get up to walk or use the bathroom.      You should rest and relax for the next 24 hours. We recommend you make arrangements to have an adult stay with you for at least 24 hours after your discharge.  Avoid hazardous and strenuous activity.      DO NOT DRIVE any vehicle or operate mechanical equipment for 24 hours following the end of your surgery.  Even though you may feel normal, your reactions may be affected by the medication you have received.      Do not drink alcoholic beverages for 24 hours following surgery.       Slowly progress to your regular diet as you feel able. It's not unusual to feel nauseated and/or vomit after receiving anesthesia.  If you develop these symptoms, drink clear liquids (apple juice, ginger ale, broth, 7-up, etc. ) until you feel better.  If your nausea and vomiting persists for 24 hours, please notify your surgeon.        All narcotic pain medications, along with inactivity and anesthesia, can cause constipation. Drinking plenty of liquids and increasing fiber intake will help.      For any questions of a medical nature, call your surgeon.      Do not make important decisions for 24 hours.      If you had general anesthesia, you may have a sore throat for a couple of days related to the breathing tube used during surgery.  You may use Cepacol lozenges to help with this discomfort.  If it worsens or if you develop a fever, contact your surgeon.     If you feel your pain is not well managed with the pain medications prescribed by your surgeon, please contact your surgeon's office to let them know so they can address your concerns.     While you were at the hospital today,  you were given 975 mg of Tylenol at 1:40 PM 8/17/2018. The recommended 24 hour dose is 4000 mg.    Today you received Toradol, an antiinflammatory medication  similar to Ibuprofen.  You should not take other antiinflammatory medication, such as Ibuprofen, Motrin, Advil, Aleve, Naprosyn, etc until 8:10pm.       **If you have questions or concerns about your procedure,   call Dr Danielle Albright at  [phone]**

## 2018-08-17 NOTE — OR NURSING
Reviewed discharge instructions with patient and family. All questions answered. VSS. Medications reviewed. Discharged into the care of her parents

## 2018-08-17 NOTE — OP NOTE
[facility]  Gynecology Surgery    Waqar Stokes  [id number]  2018    Preoperative diagnosis: suspected retained products of conception    Postoperative diagnosis: same    Procedure: suction dilation and currettage    Surgeon: Danielle Albright MD    Anesthesia: general ET    Findings: normal-sized mid-position uterus, cervix slightly dilated, clot and likely tissue seen within suction tubing throughout case    Specimens: POC    Complications: none    EBL: 20 mL    IVF: 500 mL    UOP: 50 mL    Indication and consent: This is a 37 year old  who presented for scheduled D&C for suspected retained POC. She is 6 wks s/p  of IVF pregnancy after IOL for oligohydramnios. Placenta was noted to be focally adherent upon delivery, although on inspection appeared intact and pathology indicates it was intact. She presented to her postpartum visit reporting persistent VB. An ultrasound in the office indicated thickened endometrium with debris and bloodflow suspicious for retained POC. Options for management were discussed and she initially opted for medical management with cytotec. She was given 800 mcg and then this dose was repeated upon her return to the office 3 d later when ultrasound findings were unchanged. Given failure of medical management, D&C was recommended to prevent future complications related to infection, bleeding, or fertility. The risks of the procedure including bleeding, infection, uterine perforation, injury to surrounding structures, need for reoperation were discussed with the patient. She expressed understanding and consented to proceed.    Procedure: The patient was brought to the operating room with IV fluids running. She was prepped and draped in the dorsal lithotomy position in [facility]. Her bladder was straight catheterized for urine. A timeout was performed to identify the patient and procedure. A speculum was placed in the vagina. The anterior lip of  the cervix was grasped with a tenaculum. The cervix was dilated to 8 mm with Hegar dilators. An 8 mm suction currette was obtained and placed into the uterus via the cervix. The uterus was currettaged until good uterine cry was noted throughout. A smooth metal currette was then obtained and the uterus was currettaged until good uterine cry was noted throughout. The tenaculum was removed from the cervix and tenaculum sites were hemostatic. The patient tolerated the procedure well. All counts were correct x2. The patient was brought to the recovery room in stable condition.       Danielle Albright MD

## 2018-08-20 LAB — COPATH REPORT: NORMAL

## 2019-05-16 DIAGNOSIS — L70.0 ACNE VULGARIS: ICD-10-CM

## 2019-05-16 RX ORDER — CLINDAMYCIN PHOSPHATE 11.9 MG/ML
SOLUTION TOPICAL
Qty: 60 ML | Refills: 0 | OUTPATIENT
Start: 2019-05-16

## 2019-05-16 NOTE — TELEPHONE ENCOUNTER
"Requested Prescriptions   Pending Prescriptions Disp Refills     clindamycin (CLEOCIN T) 1 % external solution [Pharmacy Med Name: CLINDAMYCIN 1% TOPICAL SOLN 60ML] 60 mL 0     Sig: APPLY EXTERNALLY TO THE AFFECTED AREA TWICE DAILY       Topical Acne Medications Protocol Failed - 5/16/2019  1:46 PM        Failed - Recent (12 mo) or future (30 days) visit within the authorizing provider's specialty     Patient had office visit in the last 12 months or has a visit in the next 30 days with authorizing provider or within the authorizing provider's specialty.  See \"Patient Info\" tab in inbasket, or \"Choose Columns\" in Meds & Orders section of the refill encounter.              Passed - Patient is 12 years of age or older        Passed - Medication is active on med list        Pt needs an appt for refills  Crystal Mosquera RN on 5/16/2019 at 1:54 PM    "

## 2019-08-04 ENCOUNTER — MEDICAL CORRESPONDENCE (OUTPATIENT)
Dept: HEALTH INFORMATION MANAGEMENT | Facility: CLINIC | Age: 39
End: 2019-08-04

## 2019-08-04 ENCOUNTER — HOSPITAL ENCOUNTER (OUTPATIENT)
Dept: LAB | Facility: CLINIC | Age: 39
Discharge: HOME | End: 2019-08-04
Attending: OBSTETRICS & GYNECOLOGY | Admitting: OBSTETRICS & GYNECOLOGY
Payer: COMMERCIAL

## 2019-08-04 DIAGNOSIS — Z32.01 PREGNANCY EXAMINATION OR TEST, POSITIVE RESULT: ICD-10-CM

## 2019-08-04 DIAGNOSIS — Z32.01 PREGNANCY EXAMINATION OR TEST, POSITIVE RESULT: Primary | ICD-10-CM

## 2019-08-04 LAB — B-HCG SERPL-ACNC: 554 IU/L (ref 0–5)

## 2019-08-04 PROCEDURE — 36415 COLL VENOUS BLD VENIPUNCTURE: CPT | Performed by: OBSTETRICS & GYNECOLOGY

## 2019-08-04 PROCEDURE — 84702 CHORIONIC GONADOTROPIN TEST: CPT | Performed by: OBSTETRICS & GYNECOLOGY

## 2020-09-26 ENCOUNTER — HOSPITAL ENCOUNTER (OUTPATIENT)
Facility: CLINIC | Age: 40
Discharge: HOME | End: 2020-09-26
Attending: OBSTETRICS & GYNECOLOGY | Admitting: OBSTETRICS & GYNECOLOGY
Payer: COMMERCIAL

## 2020-09-26 VITALS
SYSTOLIC BLOOD PRESSURE: 113 MMHG | DIASTOLIC BLOOD PRESSURE: 65 MMHG | WEIGHT: 176.37 LBS | TEMPERATURE: 99.6 F | RESPIRATION RATE: 16 BRPM | HEART RATE: 93 BPM | HEIGHT: 69 IN | BODY MASS INDEX: 26.12 KG/M2

## 2020-09-26 PROBLEM — Z36.89 ENCOUNTER FOR TRIAGE IN PREGNANT PATIENT: Status: ACTIVE | Noted: 2020-09-26

## 2020-09-26 LAB — RUPTURE OF FETAL MEMBRANES BY ROM PLUS: NEGATIVE

## 2020-09-26 PROCEDURE — G0463 HOSPITAL OUTPT CLINIC VISIT: HCPCS | Mod: 25

## 2020-09-26 PROCEDURE — 84112 EVAL AMNIOTIC FLUID PROTEIN: CPT | Performed by: OBSTETRICS & GYNECOLOGY

## 2020-09-26 PROCEDURE — 59025 FETAL NON-STRESS TEST: CPT

## 2020-09-26 ASSESSMENT — ACTIVITIES OF DAILY LIVING (ADL)
DRESS: 0-->INDEPENDENT
RETIRED_EATING: 0-->INDEPENDENT
TRANSFERRING: 0-->INDEPENDENT
AMBULATION: 0-->INDEPENDENT
BATHING: 0-->INDEPENDENT
FALL_HISTORY_WITHIN_LAST_SIX_MONTHS: NO
TOILETING: 0-->INDEPENDENT
COGNITION: 0 - NO COGNITION ISSUES REPORTED
RETIRED_COMMUNICATION: 0-->UNDERSTANDS/COMMUNICATES WITHOUT DIFFICULTY
SWALLOWING: 0-->SWALLOWS FOODS/LIQUIDS WITHOUT DIFFICULTY

## 2020-09-26 NOTE — PLAN OF CARE
Pt arrived to triage at 1800 urine specimen obtained. [facility]/US monitors applied at 1810 with pt permission. VS within normal limits. Pt denies any known exposure to covid. 1825 ROM plus swab obtained and cervical exam done fingertip 80% effaced no fluid noted upon exam. ROM sent to lab.

## 2020-09-27 ENCOUNTER — HOSPITAL ENCOUNTER (INPATIENT)
Facility: CLINIC | Age: 40
LOS: 3 days | Discharge: HOME | End: 2020-09-30
Attending: OBSTETRICS & GYNECOLOGY | Admitting: OBSTETRICS & GYNECOLOGY
Payer: COMMERCIAL

## 2020-09-27 PROBLEM — O42.919 PRETERM PREMATURE RUPTURE OF MEMBRANES: Status: ACTIVE | Noted: 2020-09-27

## 2020-09-27 LAB
ABO + RH BLD: NORMAL
ABO + RH BLD: NORMAL
BLD GP AB SCN SERPL QL: NORMAL
BLOOD BANK CMNT PATIENT-IMP: NORMAL
LABORATORY COMMENT REPORT: NORMAL
RUPTURE OF FETAL MEMBRANES BY ROM PLUS: POSITIVE
SARS-COV-2 RNA SPEC QL NAA+PROBE: NEGATIVE
SARS-COV-2 RNA SPEC QL NAA+PROBE: NORMAL
SPECIMEN EXP DATE BLD: NORMAL
SPECIMEN SOURCE: NORMAL
SPECIMEN SOURCE: NORMAL

## 2020-09-27 PROCEDURE — G0463 HOSPITAL OUTPT CLINIC VISIT: HCPCS | Mod: 25

## 2020-09-27 PROCEDURE — 25000125 ZZHC RX 250: Performed by: OBSTETRICS & GYNECOLOGY

## 2020-09-27 PROCEDURE — 25000128 H RX IP 250 OP 636: Performed by: OBSTETRICS & GYNECOLOGY

## 2020-09-27 PROCEDURE — 84112 EVAL AMNIOTIC FLUID PROTEIN: CPT | Performed by: OBSTETRICS & GYNECOLOGY

## 2020-09-27 PROCEDURE — 12000000 ZZH R&B MED SURG/OB

## 2020-09-27 PROCEDURE — 86900 BLOOD TYPING SEROLOGIC ABO: CPT | Performed by: OBSTETRICS & GYNECOLOGY

## 2020-09-27 PROCEDURE — 25800030 ZZH RX IP 258 OP 636: Performed by: ANESTHESIOLOGY

## 2020-09-27 PROCEDURE — 86850 RBC ANTIBODY SCREEN: CPT | Performed by: OBSTETRICS & GYNECOLOGY

## 2020-09-27 PROCEDURE — 25800030 ZZH RX IP 258 OP 636: Performed by: OBSTETRICS & GYNECOLOGY

## 2020-09-27 PROCEDURE — 86780 TREPONEMA PALLIDUM: CPT | Performed by: OBSTETRICS & GYNECOLOGY

## 2020-09-27 PROCEDURE — U0003 INFECTIOUS AGENT DETECTION BY NUCLEIC ACID (DNA OR RNA); SEVERE ACUTE RESPIRATORY SYNDROME CORONAVIRUS 2 (SARS-COV-2) (CORONAVIRUS DISEASE [COVID-19]), AMPLIFIED PROBE TECHNIQUE, MAKING USE OF HIGH THROUGHPUT TECHNOLOGIES AS DESCRIBED BY CMS-2020-01-R: HCPCS | Performed by: OBSTETRICS & GYNECOLOGY

## 2020-09-27 PROCEDURE — 59025 FETAL NON-STRESS TEST: CPT

## 2020-09-27 PROCEDURE — 86901 BLOOD TYPING SEROLOGIC RH(D): CPT | Performed by: OBSTETRICS & GYNECOLOGY

## 2020-09-27 RX ORDER — NALOXONE HYDROCHLORIDE 0.4 MG/ML
.1-.4 INJECTION, SOLUTION INTRAMUSCULAR; INTRAVENOUS; SUBCUTANEOUS
Status: DISCONTINUED | OUTPATIENT
Start: 2020-09-27 | End: 2020-09-28

## 2020-09-27 RX ORDER — CARBOPROST TROMETHAMINE 250 UG/ML
250 INJECTION, SOLUTION INTRAMUSCULAR
Status: DISCONTINUED | OUTPATIENT
Start: 2020-09-27 | End: 2020-09-30 | Stop reason: HOSPADM

## 2020-09-27 RX ORDER — ONDANSETRON 2 MG/ML
4 INJECTION INTRAMUSCULAR; INTRAVENOUS EVERY 6 HOURS PRN
Status: DISCONTINUED | OUTPATIENT
Start: 2020-09-27 | End: 2020-09-30 | Stop reason: HOSPADM

## 2020-09-27 RX ORDER — METHYLERGONOVINE MALEATE 0.2 MG/ML
200 INJECTION INTRAVENOUS
Status: DISCONTINUED | OUTPATIENT
Start: 2020-09-27 | End: 2020-09-28

## 2020-09-27 RX ORDER — LIDOCAINE 40 MG/G
CREAM TOPICAL
Status: DISCONTINUED | OUTPATIENT
Start: 2020-09-27 | End: 2020-09-30 | Stop reason: HOSPADM

## 2020-09-27 RX ORDER — PENICILLIN G POTASSIUM 5000000 [IU]/1
5 INJECTION, POWDER, FOR SOLUTION INTRAMUSCULAR; INTRAVENOUS ONCE
Status: COMPLETED | OUTPATIENT
Start: 2020-09-27 | End: 2020-09-27

## 2020-09-27 RX ORDER — FENTANYL CITRATE 50 UG/ML
50-100 INJECTION, SOLUTION INTRAMUSCULAR; INTRAVENOUS
Status: DISCONTINUED | OUTPATIENT
Start: 2020-09-27 | End: 2020-09-30 | Stop reason: HOSPADM

## 2020-09-27 RX ORDER — SODIUM CHLORIDE, SODIUM LACTATE, POTASSIUM CHLORIDE, CALCIUM CHLORIDE 600; 310; 30; 20 MG/100ML; MG/100ML; MG/100ML; MG/100ML
INJECTION, SOLUTION INTRAVENOUS CONTINUOUS
Status: DISCONTINUED | OUTPATIENT
Start: 2020-09-27 | End: 2020-09-28

## 2020-09-27 RX ORDER — TRANEXAMIC ACID 10 MG/ML
1 INJECTION, SOLUTION INTRAVENOUS EVERY 30 MIN PRN
Status: DISCONTINUED | OUTPATIENT
Start: 2020-09-27 | End: 2020-09-28

## 2020-09-27 RX ORDER — ACETAMINOPHEN 325 MG/1
650 TABLET ORAL EVERY 4 HOURS PRN
Status: DISCONTINUED | OUTPATIENT
Start: 2020-09-27 | End: 2020-09-28

## 2020-09-27 RX ORDER — ONDANSETRON 2 MG/ML
4 INJECTION INTRAMUSCULAR; INTRAVENOUS EVERY 6 HOURS PRN
Status: DISCONTINUED | OUTPATIENT
Start: 2020-09-27 | End: 2020-09-28

## 2020-09-27 RX ORDER — OXYTOCIN/0.9 % SODIUM CHLORIDE 30/500 ML
1-24 PLASTIC BAG, INJECTION (ML) INTRAVENOUS CONTINUOUS
Status: DISCONTINUED | OUTPATIENT
Start: 2020-09-27 | End: 2020-09-30 | Stop reason: HOSPADM

## 2020-09-27 RX ORDER — OXYTOCIN 10 [USP'U]/ML
10 INJECTION, SOLUTION INTRAMUSCULAR; INTRAVENOUS
Status: DISCONTINUED | OUTPATIENT
Start: 2020-09-27 | End: 2020-09-28

## 2020-09-27 RX ORDER — IBUPROFEN 400 MG/1
800 TABLET, FILM COATED ORAL
Status: COMPLETED | OUTPATIENT
Start: 2020-09-27 | End: 2020-09-28

## 2020-09-27 RX ORDER — OXYCODONE AND ACETAMINOPHEN 5; 325 MG/1; MG/1
1 TABLET ORAL
Status: DISCONTINUED | OUTPATIENT
Start: 2020-09-27 | End: 2020-09-28

## 2020-09-27 RX ORDER — OXYTOCIN/0.9 % SODIUM CHLORIDE 30/500 ML
100-340 PLASTIC BAG, INJECTION (ML) INTRAVENOUS CONTINUOUS PRN
Status: COMPLETED | OUTPATIENT
Start: 2020-09-27 | End: 2020-09-28

## 2020-09-27 RX ADMIN — SODIUM CHLORIDE, POTASSIUM CHLORIDE, SODIUM LACTATE AND CALCIUM CHLORIDE: 600; 310; 30; 20 INJECTION, SOLUTION INTRAVENOUS at 21:43

## 2020-09-27 RX ADMIN — SODIUM CHLORIDE, POTASSIUM CHLORIDE, SODIUM LACTATE AND CALCIUM CHLORIDE 1000 ML: 600; 310; 30; 20 INJECTION, SOLUTION INTRAVENOUS at 23:52

## 2020-09-27 RX ADMIN — PENICILLIN G POTASSIUM 5 MILLION UNITS: 5000000 POWDER, FOR SOLUTION INTRAMUSCULAR; INTRAPLEURAL; INTRATHECAL; INTRAVENOUS at 21:43

## 2020-09-27 RX ADMIN — Medication 2 MILLI-UNITS/MIN: at 21:45

## 2020-09-27 ASSESSMENT — MIFFLIN-ST. JEOR: SCORE: 1539.97

## 2020-09-27 NOTE — PLAN OF CARE
Received report from Marissa GRAHAM RN. Patient requesting to stay for a while and have cervix rechecked. SVE unchanged from previous exam. Reviewed discharge instructions and labor precautions with patient and - both verbalized agreement and all questions answered. Patient left ambulatory with .

## 2020-09-27 NOTE — PLAN OF CARE
Dr. Patrick updated on pt status. ROM plus negative. Pt has had 4-5 UC in the past hour mild to palpation. VE fingertip 80%. Orders received for discharge or be monitored for additional hour and have cervix rechecked. Discussed with pt options pt elected to continue to monitor and have cervix rechecked as she feels contractions are increasing.

## 2020-09-27 NOTE — DISCHARGE INSTRUCTIONS
Discharge Instruction for Undelivered Patients      You were seen for: Labor Assessment and Membrane Assessment  We Consulted: Dr. Patrick  You had (Test or Medicine):fetal and uterine monitoring, cervical exam     Diet:   Drink 8 to 12 glasses of liquids (milk, juice, water) every day.  You may eat meals and snacks.     Activity:  Rest the pelvic area. No sex. Do not stimulate breasts or nipples.  Count fetal kicks everyday (see handout)  Call your doctor or nurse midwife if your baby is moving less than usual.     Call your provider if you notice:  Swelling in your face or increased swelling in your hands or legs.  Headaches that are not relieved by Tylenol (acetaminophen).  Changes in your vision (blurring: seeing spots or stars.)  Nausea (sick to your stomach) and vomiting (throwing up).   Weight gain of 5 pounds or more per week.  Heartburn that doesn't go away.  Signs of bladder infection: pain when you urinate (use the toilet), need to go more often and more urgently.  The bag of ballesteros (rupture of membranes) breaks, or you notice leaking in your underwear.  Bright red blood in your underwear.  Abdominal (lower belly) or stomach pain.  Second (plus) baby: Contractions (tightening) less than 10 minutes apart and getting stronger.  Increase or change in vaginal discharge (note the color and amount)  Other:     Follow-up:  As scheduled in the clinic

## 2020-09-28 ENCOUNTER — ANESTHESIA EVENT (OUTPATIENT)
Dept: OBGYN | Facility: CLINIC | Age: 40
End: 2020-09-28
Payer: COMMERCIAL

## 2020-09-28 ENCOUNTER — ANESTHESIA (OUTPATIENT)
Dept: OBGYN | Facility: CLINIC | Age: 40
End: 2020-09-28
Payer: COMMERCIAL

## 2020-09-28 LAB
AMPHETAMINES UR QL SCN: NEGATIVE
CANNABINOIDS UR QL: NEGATIVE
COCAINE UR QL: NEGATIVE
GROUP B STREP PCR: NORMAL
OPIATES UR QL SCN: NEGATIVE
PCP UR QL SCN: NEGATIVE
T PALLIDUM AB SER QL: NONREACTIVE

## 2020-09-28 PROCEDURE — 0KQM0ZZ REPAIR PERINEUM MUSCLE, OPEN APPROACH: ICD-10-PCS | Performed by: OBSTETRICS & GYNECOLOGY

## 2020-09-28 PROCEDURE — 25000128 H RX IP 250 OP 636: Performed by: ANESTHESIOLOGY

## 2020-09-28 PROCEDURE — 25000125 ZZHC RX 250: Performed by: OBSTETRICS & GYNECOLOGY

## 2020-09-28 PROCEDURE — 80307 DRUG TEST PRSMV CHEM ANLYZR: CPT | Performed by: OBSTETRICS & GYNECOLOGY

## 2020-09-28 PROCEDURE — 25000132 ZZH RX MED GY IP 250 OP 250 PS 637: Performed by: OBSTETRICS & GYNECOLOGY

## 2020-09-28 PROCEDURE — 37000011 ZZH ANESTHESIA WARD SERVICE

## 2020-09-28 PROCEDURE — 3E0R3BZ INTRODUCTION OF ANESTHETIC AGENT INTO SPINAL CANAL, PERCUTANEOUS APPROACH: ICD-10-PCS | Performed by: ANESTHESIOLOGY

## 2020-09-28 PROCEDURE — 00HU33Z INSERTION OF INFUSION DEVICE INTO SPINAL CANAL, PERCUTANEOUS APPROACH: ICD-10-PCS | Performed by: ANESTHESIOLOGY

## 2020-09-28 PROCEDURE — 25000125 ZZHC RX 250: Performed by: ANESTHESIOLOGY

## 2020-09-28 PROCEDURE — 12000035 ZZH R&B POSTPARTUM

## 2020-09-28 PROCEDURE — 25000128 H RX IP 250 OP 636: Performed by: OBSTETRICS & GYNECOLOGY

## 2020-09-28 PROCEDURE — 72200001 ZZH LABOR CARE VAGINAL DELIVERY SINGLE

## 2020-09-28 PROCEDURE — 25800030 ZZH RX IP 258 OP 636: Performed by: OBSTETRICS & GYNECOLOGY

## 2020-09-28 RX ORDER — NALOXONE HYDROCHLORIDE 0.4 MG/ML
.1-.4 INJECTION, SOLUTION INTRAMUSCULAR; INTRAVENOUS; SUBCUTANEOUS
Status: DISCONTINUED | OUTPATIENT
Start: 2020-09-28 | End: 2020-09-28

## 2020-09-28 RX ORDER — HYDROCORTISONE 2.5 %
CREAM (GRAM) TOPICAL 3 TIMES DAILY PRN
Status: DISCONTINUED | OUTPATIENT
Start: 2020-09-28 | End: 2020-09-30 | Stop reason: HOSPADM

## 2020-09-28 RX ORDER — OXYTOCIN 10 [USP'U]/ML
10 INJECTION, SOLUTION INTRAMUSCULAR; INTRAVENOUS
Status: DISCONTINUED | OUTPATIENT
Start: 2020-09-28 | End: 2020-09-30 | Stop reason: HOSPADM

## 2020-09-28 RX ORDER — BISACODYL 10 MG
10 SUPPOSITORY, RECTAL RECTAL DAILY PRN
Status: DISCONTINUED | OUTPATIENT
Start: 2020-09-30 | End: 2020-09-30 | Stop reason: HOSPADM

## 2020-09-28 RX ORDER — TRANEXAMIC ACID 10 MG/ML
1 INJECTION, SOLUTION INTRAVENOUS EVERY 30 MIN PRN
Status: DISCONTINUED | OUTPATIENT
Start: 2020-09-28 | End: 2020-09-30 | Stop reason: HOSPADM

## 2020-09-28 RX ORDER — AMOXICILLIN 250 MG
2 CAPSULE ORAL 2 TIMES DAILY
Status: DISCONTINUED | OUTPATIENT
Start: 2020-09-28 | End: 2020-09-30 | Stop reason: HOSPADM

## 2020-09-28 RX ORDER — ONDANSETRON 2 MG/ML
4 INJECTION INTRAMUSCULAR; INTRAVENOUS EVERY 6 HOURS PRN
Status: DISCONTINUED | OUTPATIENT
Start: 2020-09-28 | End: 2020-09-28

## 2020-09-28 RX ORDER — NALOXONE HYDROCHLORIDE 0.4 MG/ML
.1-.4 INJECTION, SOLUTION INTRAMUSCULAR; INTRAVENOUS; SUBCUTANEOUS
Status: DISCONTINUED | OUTPATIENT
Start: 2020-09-28 | End: 2020-09-30 | Stop reason: HOSPADM

## 2020-09-28 RX ORDER — OXYTOCIN/0.9 % SODIUM CHLORIDE 30/500 ML
100 PLASTIC BAG, INJECTION (ML) INTRAVENOUS CONTINUOUS
Status: DISCONTINUED | OUTPATIENT
Start: 2020-09-28 | End: 2020-09-30 | Stop reason: HOSPADM

## 2020-09-28 RX ORDER — MISOPROSTOL 200 UG/1
800 TABLET ORAL
Status: DISCONTINUED | OUTPATIENT
Start: 2020-09-28 | End: 2020-09-30 | Stop reason: HOSPADM

## 2020-09-28 RX ORDER — ACETAMINOPHEN 325 MG/1
650 TABLET ORAL EVERY 4 HOURS PRN
Status: DISCONTINUED | OUTPATIENT
Start: 2020-09-28 | End: 2020-09-30 | Stop reason: HOSPADM

## 2020-09-28 RX ORDER — AMOXICILLIN 250 MG
1 CAPSULE ORAL 2 TIMES DAILY
Status: DISCONTINUED | OUTPATIENT
Start: 2020-09-28 | End: 2020-09-30 | Stop reason: HOSPADM

## 2020-09-28 RX ORDER — LIDOCAINE HYDROCHLORIDE AND EPINEPHRINE 15; 5 MG/ML; UG/ML
INJECTION, SOLUTION EPIDURAL PRN
Status: DISCONTINUED | OUTPATIENT
Start: 2020-09-28 | End: 2020-09-29 | Stop reason: HOSPADM

## 2020-09-28 RX ORDER — OXYTOCIN/0.9 % SODIUM CHLORIDE 30/500 ML
340 PLASTIC BAG, INJECTION (ML) INTRAVENOUS CONTINUOUS PRN
Status: DISCONTINUED | OUTPATIENT
Start: 2020-09-28 | End: 2020-09-30 | Stop reason: HOSPADM

## 2020-09-28 RX ORDER — IBUPROFEN 400 MG/1
800 TABLET, FILM COATED ORAL EVERY 6 HOURS PRN
Status: DISCONTINUED | OUTPATIENT
Start: 2020-09-28 | End: 2020-09-30 | Stop reason: HOSPADM

## 2020-09-28 RX ORDER — MODIFIED LANOLIN
OINTMENT (GRAM) TOPICAL
Status: DISCONTINUED | OUTPATIENT
Start: 2020-09-28 | End: 2020-09-30 | Stop reason: HOSPADM

## 2020-09-28 RX ORDER — ROPIVACAINE HYDROCHLORIDE 2 MG/ML
10 INJECTION, SOLUTION EPIDURAL; INFILTRATION; PERINEURAL ONCE
Status: COMPLETED | OUTPATIENT
Start: 2020-09-28 | End: 2020-09-28

## 2020-09-28 RX ORDER — ONDANSETRON 4 MG/1
4 TABLET, ORALLY DISINTEGRATING ORAL EVERY 6 HOURS PRN
Status: DISCONTINUED | OUTPATIENT
Start: 2020-09-28 | End: 2020-09-28

## 2020-09-28 RX ORDER — OXYCODONE HYDROCHLORIDE 5 MG/1
5 TABLET ORAL ONCE
Status: COMPLETED | OUTPATIENT
Start: 2020-09-28 | End: 2020-09-28

## 2020-09-28 RX ORDER — NALBUPHINE HYDROCHLORIDE 10 MG/ML
2.5-5 INJECTION, SOLUTION INTRAMUSCULAR; INTRAVENOUS; SUBCUTANEOUS EVERY 6 HOURS PRN
Status: DISCONTINUED | OUTPATIENT
Start: 2020-09-28 | End: 2020-09-28

## 2020-09-28 RX ORDER — METHYLERGONOVINE MALEATE 0.2 MG/ML
200 INJECTION INTRAVENOUS
Status: DISCONTINUED | OUTPATIENT
Start: 2020-09-28 | End: 2020-09-30 | Stop reason: HOSPADM

## 2020-09-28 RX ORDER — EPHEDRINE SULFATE 50 MG/ML
5 INJECTION, SOLUTION INTRAMUSCULAR; INTRAVENOUS; SUBCUTANEOUS
Status: DISCONTINUED | OUTPATIENT
Start: 2020-09-28 | End: 2020-09-28

## 2020-09-28 RX ADMIN — Medication 12 ML/HR: at 00:27

## 2020-09-28 RX ADMIN — DOCUSATE SODIUM 50 MG AND SENNOSIDES 8.6 MG 1 TABLET: 8.6; 5 TABLET, FILM COATED ORAL at 08:10

## 2020-09-28 RX ADMIN — IBUPROFEN 800 MG: 400 TABLET ORAL at 17:01

## 2020-09-28 RX ADMIN — ACETAMINOPHEN 650 MG: 325 TABLET, FILM COATED ORAL at 14:50

## 2020-09-28 RX ADMIN — ROPIVACAINE HYDROCHLORIDE 5 ML: 2 INJECTION, SOLUTION EPIDURAL; INFILTRATION at 00:29

## 2020-09-28 RX ADMIN — ACETAMINOPHEN 650 MG: 325 TABLET, FILM COATED ORAL at 11:02

## 2020-09-28 RX ADMIN — ROPIVACAINE HYDROCHLORIDE 5 ML: 2 INJECTION, SOLUTION EPIDURAL; INFILTRATION at 00:33

## 2020-09-28 RX ADMIN — Medication 340 ML/HR: at 02:12

## 2020-09-28 RX ADMIN — SODIUM CHLORIDE 2.5 MILLION UNITS: 9 INJECTION, SOLUTION INTRAVENOUS at 01:34

## 2020-09-28 RX ADMIN — IBUPROFEN 800 MG: 400 TABLET ORAL at 11:03

## 2020-09-28 RX ADMIN — DOCUSATE SODIUM 50 MG AND SENNOSIDES 8.6 MG 1 TABLET: 8.6; 5 TABLET, FILM COATED ORAL at 20:11

## 2020-09-28 RX ADMIN — ACETAMINOPHEN 650 MG: 325 TABLET, FILM COATED ORAL at 18:33

## 2020-09-28 RX ADMIN — SODIUM CHLORIDE, POTASSIUM CHLORIDE, SODIUM LACTATE AND CALCIUM CHLORIDE: 600; 310; 30; 20 INJECTION, SOLUTION INTRAVENOUS at 00:41

## 2020-09-28 RX ADMIN — OXYCODONE HYDROCHLORIDE 5 MG: 5 TABLET ORAL at 20:46

## 2020-09-28 RX ADMIN — IBUPROFEN 800 MG: 400 TABLET ORAL at 05:00

## 2020-09-28 RX ADMIN — LIDOCAINE HYDROCHLORIDE AND EPINEPHRINE 3 ML: 15; 5 INJECTION, SOLUTION EPIDURAL at 00:28

## 2020-09-28 RX ADMIN — ACETAMINOPHEN 650 MG: 325 TABLET, FILM COATED ORAL at 04:59

## 2020-09-28 ASSESSMENT — LIFESTYLE VARIABLES: TOBACCO_USE: 0

## 2020-09-28 NOTE — PLAN OF CARE
Data: Waqar Hogue transferred to 422 via wheelchair at 0555. Baby transferred via parent's arms.  Action: Receiving unit notified of transfer: Yes. Patient and family notified of room change. Report given to DAVID Shipley  at 0600. Belongings sent to receiving unit. Accompanied by Registered Nurse. Oriented patient to surroundings. Call light within reach. ID bands double-checked with receiving RN.  Response: Patient tolerated transfer and is stable.

## 2020-09-28 NOTE — ANESTHESIA PREPROCEDURE EVALUATION
Anesthesia Pre-Procedure Evaluation    Patient: Waqar Hogue   MRN: [id number] : 1980          Preoperative Diagnosis: * No surgery found *        Past Medical History:   Diagnosis Date     History of recurrent miscarriages     SAB 7wks 1d (summer 2017) no chromosomal abnormalities  had D&C, prior to , early on, no management needed to be done     Infertility, female     followed by [facility]; s/p IVF x 2     Past Surgical History:   Procedure Laterality Date     DILATION AND CURETTAGE SUCTION N/A 2018    Procedure: DILATION AND CURETTAGE SUCTION;  SUCTION DILATION AND CURETTAGE;  Surgeon: Danielle Albright MD;  Location: [facility]     GYN SURGERY  2017    D&C for miscarriage       Anesthesia Evaluation       history and physical reviewed . Pt has had prior anesthetic.     No history of anesthetic complications          ROS/MED HX    ENT/Pulmonary:  - neg pulmonary ROS    (-) tobacco use   Neurologic:  - neg neurologic ROS     Cardiovascular:  - neg cardiovascular ROS       METS/Exercise Tolerance:  >4 METS   Hematologic:         Musculoskeletal:         GI/Hepatic:  - neg GI/hepatic ROS       Renal/Genitourinary:         Endo:         Psychiatric:         Infectious Disease:         Malignancy:         Other:                     neg OB ROS            Physical Exam  Normal systems: cardiovascular, pulmonary and dental    Airway   Mallampati: II  TM distance: > 3 FB  Neck ROM: full  Mouth opening: > 3 cm    Dental     Cardiovascular       Pulmonary             Lab Results   Component Value Date    WBC 13.5 (H) 2018    HGB 11.0 (L) 2018    HCT 33.3 (L) 2018     2018    TSH 1.882 2017       Preop Vitals  BP Readings from Last 3 Encounters:   20 115/61   20 113/65   18 103/75    Pulse Readings from Last 3 Encounters:   20 84   20 93   18 89      Resp Readings from Last 3 Encounters:   20 16   20 16   18 16  "   SpO2 Readings from Last 3 Encounters:   08/17/18 98%   06/30/18 96%   03/26/18 98%      Temp Readings from Last 1 Encounters:   09/27/20 36.8  C (98.2  F) (Temporal)    Ht Readings from Last 1 Encounters:   09/27/20 1.727 m (5' 8\")      Wt Readings from Last 1 Encounters:   09/27/20 81.6 kg (180 lb)    Estimated body mass index is 27.37 kg/m  as calculated from the following:    Height as of this encounter: 1.727 m (5' 8\").    Weight as of this encounter: 81.6 kg (180 lb).       Anesthesia Plan      History & Physical Review      ASA Status:  2 .  OB Epidural Asa: 2            Postoperative Care      Consents  Anesthetic plan, risks, benefits and alternatives discussed with:  Patient..                 Elieser Xiong MD  "

## 2020-09-28 NOTE — LACTATION NOTE
Initial visit with Waqar, FOB and baby.     Breastfeeding general information reviewed.  Baby is 35 +3 weeks gestation and is unable tot remain latched without the nipple shield.    Advised to breastfeed  for 10-15 minutes with the shield and then pump for 15 minutes.  Bottle feed after each feeding with 10-15ml of Similac or EBM.   Encouraged rooming in, skin to skin, feeding on demand 8-12x/day or sooner if baby cues.  Explained benefits of holding and skin to skin.  Encouraged lots of skin to skin. Instructed on hand expression. Mother is able to hand express gtts.  LC set up and instructed on how to use the hospital grade pump..  Hands free pumping bra made.  Waqar breast fed her 2.5 year old daughter for  11 months.   Breastfeeding  went well and she felt she had adequate supply.    Continues to nurse well per mom. No further questions at this time.   Will follow as needed.   Shae Medrano BSN, RN, PHN, RNC-MNN, IBCLC

## 2020-09-28 NOTE — PLAN OF CARE
Vital signs stable. Postpartum assessment WDL. Pain controlled with tylenol and ibuprofen. Patient voiding without difficulty. Working on breastfeeding. Baby sleepy at the breasts. Encouraged Patient to pump after each breastfeeding attempt. Patient and infant bonding well. Will continue with current plan of care.

## 2020-09-28 NOTE — H&P
"[facility]   LABOR ADMIT    Waqar Hogue MRN# [id number]  Age: 40 year old YOB: 1980    Date of Admission: 2020    Primary care provider: El, [facility]     HPI: This is a 40 year old  at 35w2d presenting with PPROM. She has a history of fertility issues and recurrent pregnancy loss. This was a spontaneously-conceived pregnancy. She had negative NIPS for genetic screening. She has had excess maternal weight gain. An ultrasound at 28 weeks showed EFW at the 70%ile. She had another ultrasound planned in the coming week. She was seen in the MAC last night due to concern for LOF after noting multiple gushes of fluid at home and ROM was r/o. She again experienced LOF with multiple episodes of gushes of fluid at home tonight and was advised to again present to the MAC for evaluation. Amnisure positive at this point. She has had some cramping. No VB. +FM.        Obstetrical History:  Recurrent pregnancy loss - patient reports 6-7 losses, not all of which are documented  H/o term  after IOL of IVF pregnancy due to oligohydramnios; required D&C postpartum for retained POCs    Past Medical History:  This patient has no significant past medical history     Past Surgical History:  This patient has no significant past surgical history     Social History:  This patient has no significant social history     Family History:  This patient has no significant family history     Allergies:  NKDA    Physical Exam:  /61   Pulse 84   Temp 98.2  F (36.8  C) (Temporal)   Resp 16   Ht 1.727 m (5' 8\")   Wt 81.6 kg (180 lb)   LMP 2020   BMI 27.37 kg/m    Gen: NAD  Abd: soft, NT, ND, gravid  Ext: NT, nonedematous  SVE per RN In MAC at : fingertip/80/-1    FHT: 140s/mod/+accels/no decels  [place]: ctx q6-7 mins    Prenatal Labs:   Lab Results   Component Value Date    ABO A 2020    RH Pos 2020    AS Neg 2020    HEPBANG negative " 2017    TREPAB Negative 2017    HGB 11.0 (L) 2018       GBS Status:   Lab Results   Component Value Date    GBS negative 2018       Assessment:  This is a 40 year old  at 35w2d admitted to L&D due to PPROM     Plan:  Admitted to L&D.  Discussed potential that PPROM had in fact occurred yesterday given her reported symptoms but there's no way to know for sure. Discussed at this GA and with potential for PPROM now >24 hours recommendation for IOL and she agrees.  She is concerned about the potential for NICU admission. Discussed difficult to know what the baby will require until after delivery. Discussed some babies at 35 weeks do not require NICU care. Discussed suspected larger than average size works in baby's benefit.   Pitocin started for IOL  GBS unknown - abx started for ppx  Cat I FHT  Pain control PRN  Anticipate       Danielle Albright MD  2020 [id number]

## 2020-09-28 NOTE — PLAN OF CARE
Assisted Pt up to bathroom. Pt denied feeling any dizziness when up. Pt voided a large amount. Chel cares performed. Continues to monitor Pt.

## 2020-09-28 NOTE — PLAN OF CARE
Data: Patient presented to [facility] at .   Reason for maternal/fetal assessment per patient is water broke.  Patient is a . Prenatal record reviewed.      . Gestational Age 35w2d. VSS. Fetal movement present. Patient denies cramping, backache, vaginal discharge, pelvic pressure, UTI symptoms, GI problems, bloody show, vaginal bleeding, edema, headache, visual disturbances, epigastric or URQ pain, abdominal pain.   Action: Verbal consent for EFM. Triage assessment completed. EFM applied for fetal wellbeing. Uterine assessment mild contractions q 6-7 minutes. Fetal assessment: Presumed adequate fetal oxygenation documented (see flow record).   Response: Dr. Albright informed of ROM+ results, FHT's, contraction pattern, SVE. Plan per provider is admission and start pitocin. Patient verbalized agreement with plan. Patient transferred to room 214 ambulatory, oriented to room and call light. Report given to Jenna CRENSHAW RN.

## 2020-09-28 NOTE — L&D DELIVERY NOTE
OB Vaginal Delivery Summary    Waqar Hogue   Admission date: 2020  Delivery date:  20  Place of delivery: [facility]    The patient is a 40 year old  at 35w3d  wks gestation admitted to labor and delivery after PPROM.  Her  course was overall uncomplicated. She did have excess maternal weight gain with next growth ultrasound planned in the next week.  The estimate fetal weight is 6#. GBS was unknown so she was administered abx ppx.    Pitocin was administered per protocol. For pain management she had epidural with good relief. During labor the fetal heart tones were Category I.    She progressed to complete dilation at 0120 and began pushing approximately 15 minutes later She had excellent maternal expulsive efforts. Throughout the second stage, fetal heart tones were Cat I and then Cat II due to variable decelerations. At 0205 she delivered a viable male infant weighing 6#7 oz with apgars of 8 at 1 min and 9 at 5 minutes.  A double nuchal cord was identified and reduced at the perineum. The infant was immediately placed on the maternal abdomen. The cord was clamped and cut after one minute while the infant was skin to skin. The NICU team was present for delivery given GA. After a few minutes, they brought the infant to the warmer for evaluation and there were no concerns.    The placenta felt firmly attached at the fundus and a hand was placed with the uterus to gently tease this away. After delivery of the placenta, an additional manual sweep of the uterus was made given this firm attachment and history of retained products after her first delivery.  She received pitocin upon placental delivery.  QBL in the under buttocks drape 50 mL.    The cervix and vagina were inspected.  There was a small second degree perineal laceration which was repaired with 3-0 vicryl assuring hemostasis and close approximation.     Mother and baby did well and went to normal postpartum  and  nursery.      Danielle Albright MD  2020 [id number]

## 2020-09-28 NOTE — ANESTHESIA PROCEDURE NOTES
Procedure note : epidural catheter      Staff -   Anesthesiologist:  Elieser Xiong MD  Performed By: anesthesiologist  Pre-Procedure  Performed by Elieser Xiong MD  Location: OB      Pre-Anesthestic Checklist: patient identified, IV checked, site marked, risks and benefits discussed, informed consent, monitors and equipment checked, pre-op evaluation and at physician/surgeon's request    Timeout  Correct Patient: Yes   Correct Procedure: Yes   Correct Site: Yes   Correct Laterality: Yes   Correct Position: Yes   Site Marked: Yes   .   Procedure Documentation    .    Procedure: epidural catheter, .   Patient Position:sitting Insertion Site:L2-3  (midline approach) Injection technique: LORT saline and LORT air   Local skin infiltrated with 1 mL of 1% lidocaine.      Patient Prep/Sterile Barriers; povidone-iodine 7.5% surgical scrub.  .  Needle: Touhy needle   Needle Gauge: 17.    Needle Length (Inches) 3.5   # of attempts: 1 and # of redirects:  .    Catheter: 19 G . .  Catheter threaded easily  .  .   .    Assessment/Narrative  Paresthesias: No.  .  .  Aspiration negative for heme or CSF  . Test dose of 3 mL lidocaine 1.5% w/ 1:200,000 epinephrine at. Test dose negative for signs of intravascular, subdural or intrathecal injection. Comments:  Pre-procedure time out completed. Patient in sitting position, the lumbar spine was prepped and draped in sterile fashion. The L2/L3 interspace was identified and local anesthetic was injected for local skin infiltration. A 17 G touhy needle was advanced to the epidural space which was confirmed with the loss of resistance technique at 5 cm. A catheter was then advanced easily into the epidural space. The catheter was left at 9 cm at the skin. Negative aspiration of blood and CSF was confirmed. A test dose of 1.5% lidocaine with 1:200,000 epinephrine was injected through the catheter and was negative for intravascular injection. The site was covered with  sterile tegaderm and the catheter was secured with tape.

## 2020-09-28 NOTE — PLAN OF CARE
Patient, spouse, and  transferred to room 422 accompanied by Jenna SUE RN. Bedside report received at this time. ID bands double verified. Room, call light, and plan of care reviewed with patient. Spouse was already fast asleep. Safety protocol including safe sleep and bulb syringe use reviewed with patient.

## 2020-09-28 NOTE — PLAN OF CARE
Pt delivered viable male infant at 0205. Placenta delivered at 0221. Second degree laceration of the perineum. GBS status unknown, treated adequately with Penicillin.

## 2020-09-29 PROCEDURE — 25000132 ZZH RX MED GY IP 250 OP 250 PS 637: Performed by: OBSTETRICS & GYNECOLOGY

## 2020-09-29 PROCEDURE — 12000035 ZZH R&B POSTPARTUM

## 2020-09-29 RX ORDER — IBUPROFEN 800 MG/1
800 TABLET, FILM COATED ORAL EVERY 6 HOURS PRN
Qty: 60 TABLET | Refills: 0 | Status: SHIPPED | OUTPATIENT
Start: 2020-09-29

## 2020-09-29 RX ORDER — OXYCODONE HYDROCHLORIDE 5 MG/1
5 TABLET ORAL EVERY 6 HOURS PRN
Status: DISCONTINUED | OUTPATIENT
Start: 2020-09-29 | End: 2020-09-30 | Stop reason: HOSPADM

## 2020-09-29 RX ORDER — OXYCODONE HYDROCHLORIDE 5 MG/1
5 TABLET ORAL EVERY 6 HOURS PRN
Qty: 5 TABLET | Refills: 0 | Status: SHIPPED | OUTPATIENT
Start: 2020-09-29

## 2020-09-29 RX ADMIN — DOCUSATE SODIUM 50 MG AND SENNOSIDES 8.6 MG 1 TABLET: 8.6; 5 TABLET, FILM COATED ORAL at 19:25

## 2020-09-29 RX ADMIN — ACETAMINOPHEN 650 MG: 325 TABLET, FILM COATED ORAL at 09:47

## 2020-09-29 RX ADMIN — IBUPROFEN 800 MG: 400 TABLET ORAL at 12:02

## 2020-09-29 RX ADMIN — OXYCODONE HYDROCHLORIDE 5 MG: 5 TABLET ORAL at 14:36

## 2020-09-29 RX ADMIN — ACETAMINOPHEN 650 MG: 325 TABLET, FILM COATED ORAL at 00:23

## 2020-09-29 RX ADMIN — ACETAMINOPHEN 650 MG: 325 TABLET, FILM COATED ORAL at 22:17

## 2020-09-29 RX ADMIN — IBUPROFEN 800 MG: 400 TABLET ORAL at 06:26

## 2020-09-29 RX ADMIN — IBUPROFEN 800 MG: 400 TABLET ORAL at 00:23

## 2020-09-29 RX ADMIN — ACETAMINOPHEN 650 MG: 325 TABLET, FILM COATED ORAL at 05:23

## 2020-09-29 RX ADMIN — DOCUSATE SODIUM 50 MG AND SENNOSIDES 8.6 MG 2 TABLET: 8.6; 5 TABLET, FILM COATED ORAL at 09:47

## 2020-09-29 RX ADMIN — IBUPROFEN 800 MG: 400 TABLET ORAL at 19:23

## 2020-09-29 NOTE — PROGRESS NOTES
[facility] Obstetrics Post-Partum Progress Note          Assessment and Plan:    Assessment:   Post-partum day #1  Normal spontaneous vaginal delivery  L&D complications:  delivery      Doing well.      Plan:   Ambulation encouraged; pt desires discharge home if baby is able to go; due to her severe uterine cramping I did script for 5 oxycodone to go home with.           Interval History:   Doing well.  Pain is well-controlled.  No fevers.  No history of foul-smelling vaginal discharge.  Good appetite.  Denies chest pain, shortness of breath, nausea or vomiting.  Vaginal bleeding is similar to a heavy menstrual flow.  Ambulatory.  Breastfeeding well.          Significant Problems:    None          Review of Systems:    The patient denies any chest pain, shortness of breath, excessive pain, fever, chills, purulent drainage from the wound, nausea or vomiting.          Medications:     Current Facility-Administered Medications   Medication     acetaminophen (TYLENOL) tablet 650 mg     [START ON 2020] bisacodyl (DULCOLAX) Suppository 10 mg     carboprost (HEMABATE) injection 250 mcg     fentaNYL (PF) (SUBLIMAZE) injection  mcg     hydrocortisone 2.5 % cream     ibuprofen (ADVIL/MOTRIN) tablet 800 mg     lactated ringers BOLUS 1,000 mL     lactated ringers BOLUS 1,000 mL     lanolin cream     lidocaine (LMX4) cream     lidocaine 1 % 0.1-1 mL     Medication Instructions - cervical ripening and induction medications     methylergonovine (METHERGINE) injection 200 mcg     misoprostol (CYTOTEC) tablet 800 mcg     naloxone (NARCAN) injection 0.1-0.4 mg     No MMR Needed - Assessment: Patient does not need MMR vaccine     NO Rho (D) immune globulin (RhoGam) needed - mother Rh POSITIVE     No Tdap Needed - Assessment: Patient does not need Tdap vaccine     ondansetron (ZOFRAN) injection 4 mg     oxyCODONE (ROXICODONE) tablet 5 mg     oxytocin (PITOCIN) 30 units in 500 mL 0.9% NaCl infusion     oxytocin  (PITOCIN) 30 units in 500 mL 0.9% NaCl infusion     oxytocin (PITOCIN) 30 units in 500 mL 0.9% NaCl infusion     oxytocin (PITOCIN) injection 10 Units     senna-docusate (SENOKOT-S/PERICOLACE) 8.6-50 MG per tablet 1 tablet    Or     senna-docusate (SENOKOT-S/PERICOLACE) 8.6-50 MG per tablet 2 tablet     sodium chloride (PF) 0.9% PF flush 3 mL     sodium chloride (PF) 0.9% PF flush 3 mL     [START ON 9/30/2020] sodium phosphate (FLEET ENEMA) 1 enema     tranexamic acid 1 g in 100 mL 0.7% NaCl IV bag (premix)     Facility-Administered Medications Ordered in Other Encounters   Medication     lidocaine-EPINEPHrine 1.5 %-1:[id number] injection             Physical Exam:   All vitals stable  No data found.    Uterine fundus is firm, non-tender and at the level of the umbilicus          Data:     Hemoglobin   Date Value Ref Range Status   06/29/2018 11.0 (L) 11.7 - 15.7 g/dL Final   12/20/2017 12.7 11.7 - 15.7 g/dL Final     No imaging studies have been ordered    Cristy Elias MD

## 2020-09-29 NOTE — PLAN OF CARE
Patient has stable vitals.  Having increased uterine cramping with feeds/pumping this evening.  Rating pain around a 7 after tylenol and ibuprofen given.  Using heating pad to front and lower back area.  I: Call placed to MD and oxycodone 5 mg X 1 dose given around 2100 with good effect.  Using ice pack and tucks to perineum.  Voiding without difficulty.  Continue to work on breast feeding.  Skin to skin encouraged with feeds.  Pumping after and supplementing ebm to baby.

## 2020-09-29 NOTE — ANESTHESIA POSTPROCEDURE EVALUATION
Patient: Waqar Hogue    * No procedures listed *    Diagnosis:* No pre-op diagnosis entered *  Diagnosis Additional Information: No value filed.    Anesthesia Type:  SEDA     Note:  Anesthesia Post Evaluation    Patient location: Postpartum Unit - via phone & chart review.  Patient participation: Able to fully participate in evaluation  Level of consciousness: awake and alert  Pain management: adequate  Airway patency: patent  Cardiovascular status: acceptable and hemodynamically stable  Respiratory status: acceptable, nonlabored ventilation and unassisted  Hydration status: acceptable  PONV: none       Comments: Pt denies epidural-related complaints.         Last vitals:  Vitals:    09/28/20 2332 09/29/20 0947 09/29/20 1612   BP: 103/63 112/71 109/64   Pulse: 75 74 79   Resp: 16 18 16   Temp: 36.7  C (98  F) 36.6  C (97.8  F) 36.6  C (97.9  F)   SpO2:            Electronically Signed By: Otilio Deras MD  September 29, 2020  5:52 PM

## 2020-09-29 NOTE — PLAN OF CARE
Vital signs stable. Postpartum assessment WDL. Pain controlled with ice packs, tucks, hot packs, tylenol and ibuprofen. Patient voiding without difficulty. Breastfeeding on cue with minimal assist, using shield PRN. Pumping after feedings. Patient and infant bonding well. Still thinking about flu vaccine. Will continue with current plan of care.

## 2020-09-29 NOTE — PLAN OF CARE
Waqar independent with self and  cares.  Tylenol and Ibuprofen  controlling pain well.  Will continue to breast feed and then pump after feeds.

## 2020-09-29 NOTE — LACTATION NOTE
"Routine visit with Sitania and infant. Infant latched onto left breast without the nipple shield. Flanged lips. Deep latch. Nutritive suckle pattern observed. Infant topping off with formula via bottle after each breastfeeding session. Keeping time at the breast to 15-20 minutes.    Breastfeeding general information reviewed. Advised to breastfeed exclusively, on demand, avoid pacifiers, bottles and formula unless medically indicated.    Encouraged rooming in, skin to skin, feeding on demand 8-12x/day or sooner if baby cues.  Explained benefits of holding and skin to skin. Discussed typical feeding pattern for the next 24-48 hours.     Discussed typical onset of mature milk. Instructed on hand expression and when to start pumping and introducing a bottle if needed when returning to work.     Breastfeeding going well per mother. Encouraged to read \"A New Beginning\". Patient thankful for LC support and advice.    All questions answered. No further questions at this time. Will follow as needed.     FLOWER Clark RN, BSN, PHN, IBCLC    "

## 2020-09-30 VITALS
WEIGHT: 180 LBS | DIASTOLIC BLOOD PRESSURE: 59 MMHG | RESPIRATION RATE: 16 BRPM | HEIGHT: 68 IN | TEMPERATURE: 98.4 F | HEART RATE: 69 BPM | BODY MASS INDEX: 27.28 KG/M2 | SYSTOLIC BLOOD PRESSURE: 117 MMHG | OXYGEN SATURATION: 97 %

## 2020-09-30 PROCEDURE — 25000132 ZZH RX MED GY IP 250 OP 250 PS 637: Performed by: OBSTETRICS & GYNECOLOGY

## 2020-09-30 RX ADMIN — ACETAMINOPHEN 650 MG: 325 TABLET, FILM COATED ORAL at 09:57

## 2020-09-30 RX ADMIN — IBUPROFEN 800 MG: 400 TABLET ORAL at 09:57

## 2020-09-30 RX ADMIN — IBUPROFEN 800 MG: 400 TABLET ORAL at 03:59

## 2020-09-30 RX ADMIN — ACETAMINOPHEN 650 MG: 325 TABLET, FILM COATED ORAL at 03:59

## 2020-09-30 NOTE — PLAN OF CARE
Vital signs are stable.  Sitania independent with self and  cares.  Tylenol and Ibuprofen  controlling pain well.  Will continue to breast feed and then pump after feeds.

## 2020-09-30 NOTE — PLAN OF CARE
RN called Dr. Elias  And discontinued discharge, will round in AM.  Patient is c/o of swollen legs ankles.  Compression socks given and encouraged to ambulate in [place].  Ambulated and tolerated walking.  Evleated the legs in the room.  ALEX has not picked up or touched the baby today.  Waqar asked the nurses for help with  Changing the diaper, changing the clothes, washing bottles.  ALEX replies he is busy working and Waqar also states he is busy.  Waqar has a sister in law that she states in helpful and her parents will be able to help. Oxycodone and Ibuprofen controlling pain well. Will continue to monitor and assist as needed.

## 2020-09-30 NOTE — PLAN OF CARE
D: VSS, assessments WDL.   I: Pt. received complete discharge paperwork and home medications as filled by discharge pharmacy--ibuprofen and oxycodone. Has a breast pump at home already.  Pt. was given times of last dose for all discharge medications in writing on discharge medication sheets.  Discharge teaching included home medication, pain management, activity restrictions, postpartum cares, and signs and symptoms of infection.    A: Discharge outcomes on care plan met.  Mother states understanding and comfort with self and baby cares.  P: Pt. discharged to home.  Pt. was discharged with baby, and bands were checked at time of discharge.  Pt. was accompanied by , nurse and baby, and left with personal belongings.  Home care sent.  Pt. to follow up with OB per MD order.  Pt. had no further questions at the time of discharge and no unmet needs were identified.

## 2020-09-30 NOTE — PLAN OF CARE
"Vital signs stable. Postpartum assessment WDL. Rating pain 7-9/10, wanting to sleep through pain medication. Using tylenol, ibuprofen, and hot packs for pain control. Patient voiding without difficulty. Breastfeeding on cue, declining assistance. Reported that breastfeeding is going worse than before, and that infant \"only wants the bottle\". Pumping, feeding EBM/formula via bottle. Patient and infant bonding well. Declining flu vaccine. Will continue with current plan of care, plan to discharge.    "

## 2020-09-30 NOTE — PROGRESS NOTES
"Waqar Hogue  2020    S: pt doing well.  Tired and sore but overall Pain better controlled,  Ambulating without difficulty.  Tolerating po intake.  Decreased lochia.  Breast feeding.    O: /67   Pulse 66   Temp 98  F (36.7  C) (Oral)   Resp 16   Ht 1.727 m (5' 8\")   Wt 81.6 kg (180 lb)   LMP 2020   SpO2 97%   Breastfeeding Unknown   BMI 27.37 kg/m    No results for input(s): HGB in the last 168 hours.  Abdomen: soft, non tender, fundus firm below the umbilicus  Ext: non tender, no edema or erythema    A/P: s/p  PPD #2- delivery  Doing well  Continue routine post partum care  Discharge planning for today    Margo Álvarez MD    "

## 2020-09-30 NOTE — LACTATION NOTE
"Routine visit with Waqar, ALEX and infnat. Infant snuggled and sleeping in bassinet at this time. Pacifier in mouth. Waqar states breastfeeding is not going very well. Infant frustrated at the breast and that the feeding process of breastfeeding, bottle feeding and pumping is really time consuming. LC recommending for FOB to bottle feed infant and wash pump parts for patient. Encouraged to schedule outpatient LC appointment to assist with weaning infant off of supplements when mature milk comes in. Waqar also encouraged to watch \"Paced Bottle Feeding\" videos to slow the flow of the bottle to mimic the breast closer. LC recommended this last night as well. Will continue to follow as needed.  FLOWER Clark RN, BSN, PHN, IBCLC    "

## 2022-01-06 ENCOUNTER — MEDICAL CORRESPONDENCE (OUTPATIENT)
Dept: HEALTH INFORMATION MANAGEMENT | Facility: CLINIC | Age: 42
End: 2022-01-06

## 2022-01-06 DIAGNOSIS — N96 RECURRENT PREGNANCY LOSS WITHOUT CURRENT PREGNANCY: Primary | ICD-10-CM

## (undated) DEVICE — GLOVE PROTEXIS W/NEU-THERA 6.5  2D73TE65

## (undated) DEVICE — TUBING VACUUM COLLECTION 6FT 23116

## (undated) DEVICE — SOL WATER IRRIG 1000ML BOTTLE 2F7114

## (undated) DEVICE — LINEN TOWEL PACK X5 5464

## (undated) DEVICE — PACK TVT HYSTEROSCOPY SMA15HYFSE

## (undated) DEVICE — CATH INTERMITTENT CLEAN-CATH FEMALE 14FR 6" VINYL LF 420614

## (undated) DEVICE — SOL NACL 0.9% IRRIG 1000ML BOTTLE 07138-09

## (undated) RX ORDER — FENTANYL CITRATE 50 UG/ML
INJECTION, SOLUTION INTRAMUSCULAR; INTRAVENOUS
Status: DISPENSED
Start: 2018-08-17

## (undated) RX ORDER — KETOROLAC TROMETHAMINE 30 MG/ML
INJECTION, SOLUTION INTRAMUSCULAR; INTRAVENOUS
Status: DISPENSED
Start: 2018-08-17

## (undated) RX ORDER — ACETAMINOPHEN 325 MG/1
TABLET ORAL
Status: DISPENSED
Start: 2018-08-17

## (undated) RX ORDER — PROPOFOL 10 MG/ML
INJECTION, EMULSION INTRAVENOUS
Status: DISPENSED
Start: 2018-08-17